# Patient Record
Sex: MALE | Race: WHITE
[De-identification: names, ages, dates, MRNs, and addresses within clinical notes are randomized per-mention and may not be internally consistent; named-entity substitution may affect disease eponyms.]

---

## 2020-01-07 ENCOUNTER — HOSPITAL ENCOUNTER (OUTPATIENT)
Dept: HOSPITAL 47 - EC | Age: 55
Setting detail: OBSERVATION
LOS: 1 days | Discharge: HOME | End: 2020-01-08
Attending: FAMILY MEDICINE | Admitting: FAMILY MEDICINE
Payer: COMMERCIAL

## 2020-01-07 VITALS — RESPIRATION RATE: 18 BRPM

## 2020-01-07 VITALS — BODY MASS INDEX: 22.7 KG/M2

## 2020-01-07 DIAGNOSIS — R13.10: ICD-10-CM

## 2020-01-07 DIAGNOSIS — E86.0: ICD-10-CM

## 2020-01-07 DIAGNOSIS — K22.2: Primary | ICD-10-CM

## 2020-01-07 DIAGNOSIS — K57.90: ICD-10-CM

## 2020-01-07 DIAGNOSIS — Z90.81: ICD-10-CM

## 2020-01-07 DIAGNOSIS — Z87.738: ICD-10-CM

## 2020-01-07 DIAGNOSIS — R63.4: ICD-10-CM

## 2020-01-07 LAB
ALBUMIN SERPL-MCNC: 3.8 G/DL (ref 3.5–5)
ALP SERPL-CCNC: 98 U/L (ref 38–126)
ALT SERPL-CCNC: 32 U/L (ref 4–49)
AMYLASE SERPL-CCNC: 69 U/L (ref 30–110)
ANION GAP SERPL CALC-SCNC: 7 MMOL/L
AST SERPL-CCNC: 36 U/L (ref 17–59)
BASOPHILS # BLD AUTO: 0 K/UL (ref 0–0.2)
BASOPHILS NFR BLD AUTO: 1 %
BUN SERPL-SCNC: 42 MG/DL (ref 9–20)
CALCIUM SPEC-MCNC: 9.8 MG/DL (ref 8.4–10.2)
CHLORIDE SERPL-SCNC: 102 MMOL/L (ref 98–107)
CO2 SERPL-SCNC: 31 MMOL/L (ref 22–30)
EOSINOPHIL # BLD AUTO: 0.1 K/UL (ref 0–0.7)
EOSINOPHIL NFR BLD AUTO: 2 %
ERYTHROCYTE [DISTWIDTH] IN BLOOD BY AUTOMATED COUNT: 4.01 M/UL (ref 4.3–5.9)
ERYTHROCYTE [DISTWIDTH] IN BLOOD: 11.9 % (ref 11.5–15.5)
GLUCOSE SERPL-MCNC: 139 MG/DL (ref 74–99)
HCT VFR BLD AUTO: 35.3 % (ref 39–53)
HGB BLD-MCNC: 11.9 GM/DL (ref 13–17.5)
LYMPHOCYTES # SPEC AUTO: 1.2 K/UL (ref 1–4.8)
LYMPHOCYTES NFR SPEC AUTO: 27 %
MCH RBC QN AUTO: 29.7 PG (ref 25–35)
MCHC RBC AUTO-ENTMCNC: 33.7 G/DL (ref 31–37)
MCV RBC AUTO: 88 FL (ref 80–100)
MONOCYTES # BLD AUTO: 0.2 K/UL (ref 0–1)
MONOCYTES NFR BLD AUTO: 5 %
NEUTROPHILS # BLD AUTO: 2.8 K/UL (ref 1.3–7.7)
NEUTROPHILS NFR BLD AUTO: 63 %
PH UR: 6 [PH] (ref 5–8)
PLATELET # BLD AUTO: 208 K/UL (ref 150–450)
POTASSIUM SERPL-SCNC: 4.1 MMOL/L (ref 3.5–5.1)
PROT SERPL-MCNC: 6.8 G/DL (ref 6.3–8.2)
SODIUM SERPL-SCNC: 140 MMOL/L (ref 137–145)
SP GR UR: 1.02 (ref 1–1.03)
SQUAMOUS UR QL AUTO: <1 /HPF (ref 0–4)
UROBILINOGEN UR QL STRIP: <2 MG/DL (ref ?–2)
WBC # BLD AUTO: 4.4 K/UL (ref 3.8–10.6)
WBC # UR AUTO: 3 /HPF (ref 0–5)

## 2020-01-07 PROCEDURE — 96360 HYDRATION IV INFUSION INIT: CPT

## 2020-01-07 PROCEDURE — 82150 ASSAY OF AMYLASE: CPT

## 2020-01-07 PROCEDURE — 99285 EMERGENCY DEPT VISIT HI MDM: CPT

## 2020-01-07 PROCEDURE — 83690 ASSAY OF LIPASE: CPT

## 2020-01-07 PROCEDURE — 80053 COMPREHEN METABOLIC PANEL: CPT

## 2020-01-07 PROCEDURE — 43239 EGD BIOPSY SINGLE/MULTIPLE: CPT

## 2020-01-07 PROCEDURE — 74177 CT ABD & PELVIS W/CONTRAST: CPT

## 2020-01-07 PROCEDURE — 36415 COLL VENOUS BLD VENIPUNCTURE: CPT

## 2020-01-07 PROCEDURE — 43249 ESOPH EGD DILATION <30 MM: CPT

## 2020-01-07 PROCEDURE — 85025 COMPLETE CBC W/AUTO DIFF WBC: CPT

## 2020-01-07 PROCEDURE — 88305 TISSUE EXAM BY PATHOLOGIST: CPT

## 2020-01-07 PROCEDURE — 81001 URINALYSIS AUTO W/SCOPE: CPT

## 2020-01-07 RX ADMIN — CEFAZOLIN SCH MLS/HR: 330 INJECTION, POWDER, FOR SOLUTION INTRAMUSCULAR; INTRAVENOUS at 13:56

## 2020-01-07 NOTE — CT
EXAMINATION TYPE: CT abdomen pelvis w con

 

DATE OF EXAM: 1/7/2020

 

COMPARISON: None

 

INDICATION: Epigastric pain weight loss nausea vomiting

 

DLP: 1009.7 mGycm, Automated exposure control for dose reduction was used.

 

CONTRAST:  100 mL of Isovue 300. 

                        Study performed without Oral Contrast

 

TECHNIQUE: Axial images were obtained from above the diaphragm to the pubic rami in the axial plane a
t 5 mm thick sections.  Reconstructed images are reviewed on the computer in the coronal plane.  Exam
 is somewhat limited due to paucity of abdominal fat.

 

FINDINGS:

 

Limited CT sections are obtained the lung bases.  The lung bases are clear.  Small hiatal hernia is p
resent.

 

CT ABDOMEN:

 

Liver: Normal

 

Spleen: Normal

 

Pancreas: Normal

 

Adrenal glands: The adrenal glands are normal.

 

Gallbladder: Normal  

 

Kidneys: No masses are evident. No hydronephrosis is present.   No cysts are present.  Delayed images
 were obtained through the kidneys, which remain unremarkable.

 

Aorta: Normal

 

Inferior vena cava: Normal.

 

CT PELVIS: 

Loops of bowel within the abdomen and pelvis are normal.   Few diverticuli are through the sigmoid co
primitivo without evidence of acute diverticulitis.  There are loops of bowel which are incompletely disten
ded or lack oral contrast limiting their evaluation.

 

Appendix: Normal as visualized.

 

Urinary bladder: Normal. 

 

Genitourinary structures:

 

Osseous structures: No suspicious lytic or sclerotic lesions.

 

IMPRESSIONS:

1.  Diverticulosis without acute diverticulitis.

2. Small hiatal hernia.

3. No other suspicious abnormality to account for epigastric pain or weight loss
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
## 2020-01-08 VITALS — HEART RATE: 68 BPM | SYSTOLIC BLOOD PRESSURE: 120 MMHG | DIASTOLIC BLOOD PRESSURE: 63 MMHG

## 2020-01-08 VITALS — TEMPERATURE: 97.6 F

## 2020-01-08 RX ADMIN — CEFAZOLIN SCH: 330 INJECTION, POWDER, FOR SOLUTION INTRAMUSCULAR; INTRAVENOUS at 05:11

## 2020-01-08 NOTE — P.PCN
Date of Procedure: 01/08/20


Procedure(s) Performed: 


BRIEF HISTORY: Patient is a 54-year-old, pleasant, white male admitted hospital 

with severe progressive dysphagia for the last 2 years duration.  He lost 

approximately 70 pounds since onset of the symptoms.  He has severe passive 

regurgitation and occasional heartburn.  CT of abdomen was negative.  His and 

scheduled for an upper endoscopy to evaluate further. 





PROCEDURE PERFORMED: Esophagoscopy with biopsy and dilation.





PREOPERATIVE DIAGNOSIS: Progressive dysphagia to solids and weight loss of 70 

pounds for the last 2 years duration. 





IV sedation per anesthesia. 





PROCEDURE: After informed consent was obtained, the patient  was brought into 

the endoscopy unit. IV sedation was administered by Anesthesia under continuous 

monitoring. Initially the Olympus GIF-140 video endoscope was inserted into the 

mouth. Esophagus intubated without any difficulty. It was gradually advanced 

into the distal esophagus and there was a tight stricture identified at 40 cm 

from the incisors.  The scope could not be advanced through the stricture.  At 

this time I proceeded with a balloon dilation using 8-10 mm TTS balloon for 

total of 90 seconds and a sequential fashion.  There were thickened folds noted 

in the distal esophagus at the site of esophageal stricture.  Despite dilation 

was still not able to advance the scope into the stomach.  Multiple biopsies 

were done from the mucosal folds of the distal esophagus.  No obvious masses 

identified.  The GE junction was located at 39 cm from the incisors. The rest of

the esophagus appeared normal and the patient tolerated the procedure well. 





IMPRESSION: 


1.  Tight distal esophageal stricture with mucosal erythema, edema and thickened

mucosal folds status post balloon dilation using 8-10 mm TTS balloon as 

described above.


2.  Scope could not be advanced into the stomach..





RECOMMENDATIONS: The findings of this examination were discussed with the 

patient as well as his family.  He'll remain on a clear liquid diet.  We'll 

start him on Protonix 40 mg twice daily.  He can be discharged home today or 

tomorrow with outpatient follow-up in one week..

## 2020-01-12 NOTE — HP
HISTORY AND PHYSICAL



HISTORY OF PRESENT ILLNESS:

A 54-year-old white male presents with nausea, vomiting, weight loss, ongoing for a

year, went to PCP, told him to come to the ER. Had intermittent epigastric pain, 40

pounds loss in 6 months, unable to keep anything down. 60 pounds weight loss in 6

months. Unable to keep any liquid and solids down.



PAST MEDICAL HISTORY:

History of splenectomy and pyloric stenosis as a kid.



REVIEW OF SYSTEMS:

Fourteen-point review of systems negative except for mentioned in HPI.

VITAL SIGNS: Temp 97.1, respiratory rate 16 to 18, blood pressure 120s to 130s over 70s

to 80.  100% on room air.  Pulse is 70s to 80s.  GI soft, nontender.

NEUROLOGIC: Cranial nerves are intact.

PSYCH:  Fair mood and affect.

SKIN:  Warm and dry.

ABDOMEN:  Soft, nontender.



White count 4.4, hemoglobin 11.9, BUN 42, creatinine 0.95.  UA is negative.



ASSESSMENT:

Weight loss, nausea, vomiting, epigastric pain, dehydration.  Admit the patient.

Rehydrate him.  Get the EGD.  Further orders pending Gastroenterology consult for an

EGD recommendations.





MMODL / IJN: [id number] / Job#: [id number]

## 2020-01-14 NOTE — DS
DISCHARGE SUMMARY



A 54-year-old white male who was admitted for progressive dysphagia over the past 2

years, losing 70 pounds.  He has severe passive regurgitation and heartburn.  CT was

negative.  His EGD was scheduled and performed it. Had esophagitis with some biopsies,

was given Protonix 40 mg a day and sent home.  He also had a tight stricture at the

lower esophageal stomach border for which patient had esophageal stricture repair with

multiple biopsies.  Biopsies showed high-grade dysplasia, felt to await further

pathology where it was sent to the [facility].  He will continue on Protonix 40 mg

b.i.d. and discharged home, have to follow up in the office after pathology from [facility] is back. No smoking. No late night eating.  Head of bed elevation.





MMODL / IJN: [id number] / Job#: [id number]

## 2020-03-06 ENCOUNTER — HOSPITAL ENCOUNTER (OUTPATIENT)
Dept: HOSPITAL 47 - ORWHC2ENDO | Age: 55
Discharge: HOME | End: 2020-03-06
Attending: INTERNAL MEDICINE
Payer: COMMERCIAL

## 2020-03-06 VITALS — RESPIRATION RATE: 16 BRPM | TEMPERATURE: 97.4 F

## 2020-03-06 VITALS — HEART RATE: 57 BPM | DIASTOLIC BLOOD PRESSURE: 58 MMHG | SYSTOLIC BLOOD PRESSURE: 106 MMHG

## 2020-03-06 DIAGNOSIS — Z87.81: ICD-10-CM

## 2020-03-06 DIAGNOSIS — Z98.890: ICD-10-CM

## 2020-03-06 DIAGNOSIS — Z79.899: ICD-10-CM

## 2020-03-06 DIAGNOSIS — C15.5: Primary | ICD-10-CM

## 2020-03-06 DIAGNOSIS — K22.2: ICD-10-CM

## 2020-03-06 PROCEDURE — 43239 EGD BIOPSY SINGLE/MULTIPLE: CPT

## 2020-03-06 PROCEDURE — 43249 ESOPH EGD DILATION <30 MM: CPT

## 2020-03-06 PROCEDURE — 88305 TISSUE EXAM BY PATHOLOGIST: CPT

## 2020-03-06 NOTE — P.PCN
Date of Procedure: 03/06/20


Procedure(s) Performed: 


BRIEF HISTORY: Patient is a 54-year-old, pleasant, white male scheduled for an 

upper endoscopy for evaluation of progressive dysphagia to solids and weight 

loss for the last 1 year duration.  He had an upper endoscopy done by me in 

January of this year that revealed a tight distal esophageal stricture that was 

dilated with a 10 mm balloon but the scope could not be advanced any further.  

Biopsies revealed squamocolumnar mucosa with high-grade dysplasia.  His and his 

and scheduled for repeat upper endoscopy with biopsies for definitive 

diagnosis.. 





PROCEDURE PERFORMED: Esophagoscopy with multiple biopsies and dilation.





PREOPERATIVE DIAGNOSIS: Distal esophageal stricture with high-grade dysplasia on

recent upper endoscopy. 





IV sedation per anesthesia. 





PROCEDURE: After informed consent was obtained, the patient  was brought into 

the endoscopy unit. IV sedation was administered by Anesthesia under continuous 

monitoring. Initially the Olympus GIF-140 video endoscope was inserted into the 

mouth. Esophagus intubated without any difficulty. It was gradually advanced 

into the  distal esophagus and at 39-40 cm from the incisors there was a very 

tight esophageal stricture identified with small amount of retained liquid in 

the proximal esophagus.  The scope could not be advanced to the stricture.  At 

this time I proceeded with a balloon dilation using 8-10 mm TTS balloon in a 

sequential fashion for 90 seconds.  Following this despite multiple attempts I 

was not able to advance the scope into the stomach.  At this time I proceeded 

with multiple biopsies of the esophageal stricture with the mucosa appeared very

friable, erythematous with thickened mucosal folds but no obvious masses 

identified.  There was mild dilation of the esophagus just proximal to the 

stricture area and The rest of the esophagus appeared normal and the patient 

tolerated the procedure well. 





IMPRESSION: 


1.  Tight distal esophageal stricture with mucosal erythema friability and 

thickened folds but no obvious mass status post balloon dilation followed by 

multiple biopsies to rule out malignancy.


2.  Scope could not be advanced into the stomach and duodenum.





RECOMMENDATIONS: The findings of this examination were discussed with the 

patient as well as his family.  He will be seen in office in 5 days to discuss 

the biopsy results.

## 2020-03-21 ENCOUNTER — HOSPITAL ENCOUNTER (OUTPATIENT)
Dept: HOSPITAL 47 - RADPETMAIN | Age: 55
Discharge: HOME | End: 2020-03-21
Attending: INTERNAL MEDICINE
Payer: COMMERCIAL

## 2020-03-21 DIAGNOSIS — C15.5: Primary | ICD-10-CM

## 2020-03-21 PROCEDURE — 78815 PET IMAGE W/CT SKULL-THIGH: CPT

## 2020-03-24 NOTE — PE
EXAMINATION TYPE: PET CT fusion skull to thigh

 

DATE OF EXAM: 3/21/2020

 

COMPARISON: CT abdomen and pelvis January 7, 2020.

 

HISTORY: Esophageal cancer   

 

TECHNIQUE:  Following the intravenous administration of 12.17 mCi of F-18 FDG, whole body images are 
performed from the skull base to the midthigh.  Images are reviewed on the computer in the coronal, a
xial, and sagittal planes.  Reconstructed rotating images are created on independent workstation and 
reviewed on the computer.   A noncontrast CT is performed in conjunction with the PET scan.

 

SCAN: Initial Scan

 

FINDINGS: 

 

SKULL BASE AND NECK:  No suspicious hypermetabolic uptake.

 

CHEST, MEDIASTINUM, AND HILAR REGION: There is concentric wall thickening distal esophagus redemonstr
ated just above diaphragm beginning near axial image 127 through 136, hypermetabolic uptake is presen
t images 127 through 132, max SUV is 6.65 on image 131. 

 

No additional areas of suspicious hypermetabolic uptake are present in the remainder of the thorax. N
o suspicious adjacent lymph nodes seen. No abnormal uptake or extension below the diaphragm clearly i
dentified.

 

ABDOMEN AND PELVIS: Normal excretion is seen. Patient has very little intra-abdominal fat. No suspici
ous hypermetabolic uptake.

 

OSSEOUS STRUCTURES: No suspicious hypermetabolic uptake.

 

OTHER CT: Patient has very little intra-abdominal fat. No significant incidental findings.

 

IMPRESSION: Hypermetabolic uptake in the distal esophagus corresponds to known neoplasm. No metastati
c disease is evident.

## 2020-06-19 ENCOUNTER — HOSPITAL ENCOUNTER (OUTPATIENT)
Dept: HOSPITAL 47 - RADPETMAIN | Age: 55
Discharge: HOME | End: 2020-06-19
Attending: INTERNAL MEDICINE
Payer: COMMERCIAL

## 2020-06-19 DIAGNOSIS — C15.5: Primary | ICD-10-CM

## 2020-06-19 PROCEDURE — 78815 PET IMAGE W/CT SKULL-THIGH: CPT

## 2021-04-17 ENCOUNTER — HOSPITAL ENCOUNTER (OUTPATIENT)
Dept: HOSPITAL 47 - RADPETMAIN | Age: 56
Discharge: HOME | End: 2021-04-17
Attending: FAMILY MEDICINE
Payer: COMMERCIAL

## 2021-04-17 DIAGNOSIS — C15.9: Primary | ICD-10-CM

## 2021-04-17 PROCEDURE — 78815 PET IMAGE W/CT SKULL-THIGH: CPT

## 2021-04-19 NOTE — PE
EXAMINATION TYPE: PET CT fusion skull to thigh

 

DATE OF EXAM: 4/17/2021

 

COMPARISON: Prior PET/CT June 19, 2020 and older studies

 

HISTORY: Esophageal cancer progress study originally diagnosed by endoscopy with biopsy January and c
ompleted chemotherapy and radiation treatment in May or June 2020 per patient.      

 

TECHNIQUE:  Following the intravenous administration of 13.76 mCi of F-18 FDG, whole body images are 
performed from the skull base to the midthigh.  Images are reviewed on the computer in the coronal, a
xial, and sagittal planes.  Reconstructed rotating images are created on independent workstation and 
reviewed on the computer.   A localization and attenuation correction CT is performed in conjunction 
with the PET scan. Blood glucose level equals 98.

 

SCAN: Subsequent Scan

 

FINDINGS: 

 

SKULL BASE AND NECK:   No areas of new suspicious hypermetabolic uptake on current study.

 

CHEST, MEDIASTINUM, AND HILAR REGION: Persistent moderate to severe concentric wall thickening in the
 distal esophagus just above diaphragm from axial image 124 through 135. Prior visualized mild hyperm
etabolic uptake appear to correspond to central aspect or lumen on prior study. On current study ther
e is new more focal mild hypermetabolic uptake along the left posterior aspect centered image 129, ma
x SUV is 2.87. Recurrent active neoplasm needs to be considered.

 

ABDOMEN AND PELVIS: Normal excretion is present. No new areas of abnormal hypermetabolic uptake.

 

OSSEOUS STRUCTURES: No new areas of abnormal hypermetabolic uptake.

 

OTHER CT: Patient has very little intra-abdominal fat making evaluation suboptimal similar to prior. 
Lungs are currently clear. No new findings are evident.

 

IMPRESSION: New suspicious focal mild increased hypermetabolic uptake worrisome for local active neop
lastic recurrence distal esophageal level. No new metastatic disease noted.

## 2021-04-26 ENCOUNTER — HOSPITAL ENCOUNTER (OUTPATIENT)
Dept: HOSPITAL 47 - ORWHC2ENDO | Age: 56
Discharge: HOME | End: 2021-04-26
Attending: INTERNAL MEDICINE
Payer: COMMERCIAL

## 2021-04-26 VITALS — SYSTOLIC BLOOD PRESSURE: 112 MMHG | HEART RATE: 56 BPM | RESPIRATION RATE: 16 BRPM | DIASTOLIC BLOOD PRESSURE: 72 MMHG

## 2021-04-26 VITALS — BODY MASS INDEX: 23.1 KG/M2

## 2021-04-26 VITALS — TEMPERATURE: 97.8 F

## 2021-04-26 DIAGNOSIS — Z85.01: ICD-10-CM

## 2021-04-26 DIAGNOSIS — K22.2: Primary | ICD-10-CM

## 2021-04-26 DIAGNOSIS — Z92.3: ICD-10-CM

## 2021-04-26 DIAGNOSIS — Z92.21: ICD-10-CM

## 2021-04-26 DIAGNOSIS — R63.4: ICD-10-CM

## 2021-04-26 DIAGNOSIS — Z79.899: ICD-10-CM

## 2021-04-26 DIAGNOSIS — Z88.6: ICD-10-CM

## 2021-04-26 PROCEDURE — 43249 ESOPH EGD DILATION <30 MM: CPT

## 2021-04-26 PROCEDURE — 43239 EGD BIOPSY SINGLE/MULTIPLE: CPT

## 2021-04-26 PROCEDURE — 88305 TISSUE EXAM BY PATHOLOGIST: CPT

## 2021-04-26 NOTE — P.PCN
Date of Procedure: 04/26/21


Procedure(s) Performed: 


BRIEF HISTORY: Patient is a 55-year-old, pleasant, white male diagnosed with 

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 

radiation as well as chemotherapy.  Lately has been complaining of dysphagia for

the last 6 months duration with weight loss of 20 pounds.  He is on a soft diet.

 He had a PET scan that he can go that did show increased uptake in the distal 

esophagus and no other metastasis noted..  He scheduled for an upper endoscopy 

with dilation today.





PROCEDURE PERFORMED: Esophagoscopy with biopsy and dilation.





PREOPERATIVE DIAGNOSIS: History of esophageal cancer diagnosed have a 2020 

status post chemo/radiation therapy/now with progressive dysphagia to solids. 





IV sedation per anesthesia. 





PROCEDURE: After informed consent was obtained, the patient  was brought into 

the endoscopy unit. IV sedation was administered by Anesthesia under continuous 

monitoring. Initially the Olympus GIF-140 video endoscope was inserted into the 

mouth. Esophagus intubated without any difficulty. It was gradually advanced 

into the distal esophagus.  There was a tight esophageal stricture identified at

the scope could not be advanced through the stricture.  The luminal diameter was

about 84 mm.  At this time I proceeded with balloon dilation.  The balloon was 

passed through the stricture without any difficulty and was dilated to 8 mm, 9 

mm and 10 mm sequentially for 90 seconds.  Following this there was oozing 

identified and the site of dilation.  I was not able to advance the scope into 

the stomach.  The mucosa appeared slightly friable but there were no obvious 

mass identified.  Multiple biopsies were done from the distal esophagus.  The GE

junction was located at 40 cm from the incisors. The rest of the esophagus 

appeared normal. The patient tolerated the procedure well. 





IMPRESSION: 


1.  Tight distal esophageal stricture with friable mucosa status post balloon 

dilation using 8, 9 and 10 mm TTS balloon as described above.


2.  No obvious distal esophageal mass identified, however the scope could be 

advanced into the stomach because of the tight esophageal stricture.





RECOMMENDATIONS: The findings of this examination were discussed with the 

patient as well as a family.  He was advised to remain on a clear liquid diet 

today.  We'll await biopsy results and he'll be seen in office in 2 weeks.

## 2021-09-01 ENCOUNTER — HOSPITAL ENCOUNTER (OUTPATIENT)
Dept: HOSPITAL 47 - ORWHC2ENDO | Age: 56
Discharge: HOME | End: 2021-09-01
Attending: INTERNAL MEDICINE
Payer: COMMERCIAL

## 2021-09-01 VITALS — HEART RATE: 61 BPM | DIASTOLIC BLOOD PRESSURE: 83 MMHG | SYSTOLIC BLOOD PRESSURE: 129 MMHG

## 2021-09-01 VITALS — TEMPERATURE: 97.7 F

## 2021-09-01 VITALS — RESPIRATION RATE: 16 BRPM

## 2021-09-01 DIAGNOSIS — K22.2: Primary | ICD-10-CM

## 2021-09-01 DIAGNOSIS — Z92.21: ICD-10-CM

## 2021-09-01 DIAGNOSIS — Z85.01: ICD-10-CM

## 2021-09-01 DIAGNOSIS — Z79.82: ICD-10-CM

## 2021-09-01 DIAGNOSIS — Z92.3: ICD-10-CM

## 2021-09-01 PROCEDURE — 43249 ESOPH EGD DILATION <30 MM: CPT

## 2021-09-01 PROCEDURE — 43239 EGD BIOPSY SINGLE/MULTIPLE: CPT

## 2021-09-01 PROCEDURE — 88305 TISSUE EXAM BY PATHOLOGIST: CPT

## 2021-09-01 NOTE — P.PCN
Date of Procedure: 09/01/21


Procedure(s) Performed: 


BRIEF HISTORY: Patient is a 55-year-old, pleasant, white male with history of 

esophageal adenocarcinoma diagnosed in February 2020, status post radiation as 

well as chemotherapy.  An upper endoscopy done in April 2021 that showed tight 

distal esophagus which was dilated.  He did well for a few months and now with 

worsening dysphagia to solids.  Hence scheduled for an upper endoscopy with 

dilation today.. 





PROCEDURE PERFORMED: Esophagogastroduodenoscopy with dilation and biopsy





PREOPERATIVE DIAGNOSIS: History of esophageal cancer diagnosed in 5 years 2020 

status post chemoradiation therapy, now with worsening dysphagia. 





IV sedation per anesthesia. 





PROCEDURE: After informed consent was obtained, the patient  was brought into 

the endoscopy unit. IV sedation was administered by Anesthesia under continuous 

monitoring. Initially the Olympus GIF-140 video endoscope was inserted into the 

mouth. Esophagus intubated without any difficulty. It was gradually advanced 

into the distal esophagus and there was a tight stricture noted.  The scope 

could not be advanced and the stricture.  The diameter of the opening was about 

3 mm.  At this time I proceeded with balloon dilation using 8-10 mm TTS balloon 

in a sequential fashion for 90 seconds.  Following this there was some oozing 

noted at the site of dilation but I was not able to advance the scope into the 

stomach.  The The GE junction was located at 39 cm from the incisors.  There was

no obvious mass identified.  The mucosa appeared somewhat erythematous and 

thickened at the site of the stricture and biopsies were done from this area.  

The rest of the esophagus appeared slightly dilated with retained food in the st

omach there was thoroughly aspirated.  The patient tolerated the procedure well.

 . 





IMPRESSION: 


1.  Tight distal esophageal stricture status post balloon dilation using 8-10 mm

balloon as described above.


2.  Thickened folds in the distal esophagus but no obvious mass noted.  Status 

post multiple biopsies.





RECOMMENDATIONS: The findings of this examination were discussed with the 

patient as well as his family.  He was advised to remain on a clear liquid diet 

today.  Await biopsy results..

## 2021-09-17 ENCOUNTER — HOSPITAL ENCOUNTER (OUTPATIENT)
Dept: HOSPITAL 47 - RADPETMAIN | Age: 56
Discharge: HOME | End: 2021-09-17
Attending: INTERNAL MEDICINE
Payer: COMMERCIAL

## 2021-09-17 DIAGNOSIS — M79.89: ICD-10-CM

## 2021-09-17 DIAGNOSIS — C15.5: Primary | ICD-10-CM

## 2021-09-17 PROCEDURE — 78815 PET IMAGE W/CT SKULL-THIGH: CPT

## 2021-09-22 NOTE — PE
Nuclear medicine PET/CT

 

HISTORY: C 15.5, esophageal carcinoma, subsequent

 

Patient received 13.4 mCi F-18 FDG intravenously in delayed scanning was performed from the skull bas
e to the mid thighs. A localization and attenuation correction CT scan was performed, correlation to 
prior exam 4/17/2021

 

Chest and neck: There is no cervical or supraclavicular adenopathy. No mediastinal, axillary, or bob
r adenopathy. There is an interval development of circumferential wall thickening at the level of the
 distal esophagus, retained secretions are present within the esophagus. There is corresponding incre
ase in the hypermetabolic uptake involving the distal esophagus, SUV is 4.9. There is soft tissue dequan
ng the cardiophrenic angle anteriorly with some mild associated uptake present, abnormality is pleura
l-based and measures approximately 2 cm, SUV is 2.3, an interval finding. No pleural or pericardial e
ffusion.

 

ABDOMEN: No evident liver mass or upper abdominal adenopathy. No suspicious uptake. There is no ascit
es. Patient is cachectic.

 

Osseous structures show no suspicious uptake. Postop change suspected on the right ilium posteriorly 
on the right. Suspect physiologic uptake along the musculature of the proximal right lower extremity 
medially, SUV is 1.9-2.

 

 

IMPRESSION: Interval development of hypermetabolic soft tissue which is pleural-based in the anterior
 aspect of the right lower hemithorax. Interval progression in hypermetabolic uptake at the level of 
the distal esophagus. Additional findings above.

## 2021-10-13 ENCOUNTER — HOSPITAL ENCOUNTER (OUTPATIENT)
Dept: HOSPITAL 47 - ORWHC2ENDO | Age: 56
Discharge: HOME | End: 2021-10-13
Attending: INTERNAL MEDICINE
Payer: COMMERCIAL

## 2021-10-13 VITALS — RESPIRATION RATE: 16 BRPM | TEMPERATURE: 97.6 F

## 2021-10-13 VITALS — SYSTOLIC BLOOD PRESSURE: 154 MMHG | DIASTOLIC BLOOD PRESSURE: 91 MMHG | HEART RATE: 68 BPM

## 2021-10-13 VITALS — BODY MASS INDEX: 19.2 KG/M2

## 2021-10-13 DIAGNOSIS — K22.2: Primary | ICD-10-CM

## 2021-10-13 DIAGNOSIS — Z88.6: ICD-10-CM

## 2021-10-13 DIAGNOSIS — Z92.21: ICD-10-CM

## 2021-10-13 DIAGNOSIS — F98.8: ICD-10-CM

## 2021-10-13 DIAGNOSIS — Z92.3: ICD-10-CM

## 2021-10-13 DIAGNOSIS — Z85.01: ICD-10-CM

## 2021-10-13 DIAGNOSIS — F32.9: ICD-10-CM

## 2021-10-13 DIAGNOSIS — F41.9: ICD-10-CM

## 2021-10-13 DIAGNOSIS — Z79.899: ICD-10-CM

## 2021-10-13 PROCEDURE — 43249 ESOPH EGD DILATION <30 MM: CPT

## 2021-10-13 PROCEDURE — 88305 TISSUE EXAM BY PATHOLOGIST: CPT

## 2021-10-13 PROCEDURE — 43239 EGD BIOPSY SINGLE/MULTIPLE: CPT

## 2021-10-13 NOTE — P.PCN
Date of Procedure: 10/13/21


Procedure(s) Performed: 


BRIEF HISTORY: Patient is a 55-year-old, pleasant, white male with history of 

distal esophageal adenocarcinoma diagnosed in March 2020..  He was status post 

chemo followed by radiation therapy and subsequently developed a tight distal 

esophageal stricture.  He underwent an EGD with dilation a month ago.  Because 

of persistent symptoms he scheduled for repeat upper endoscopy with dilation 

today.. 





PROCEDURE PERFORMED: Esophagogastroduodenoscopy with dilation and biopsy.





PREOPERATIVE DIAGNOSIS: Progressive dysphagia to solids and weight loss/distal 

esophageal stricture. 





IV sedation per anesthesia. 





PROCEDURE: After informed consent was obtained, the patient  was brought into 

the endoscopy unit. IV sedation was administered by Anesthesia under continuous 

monitoring. Initially the Olympus GIF-140 video endoscope was inserted into the 

mouth. Esophagus intubated without any difficulty. It was gradually advanced 

into the   distal esophagus where there was a tight esophageal stricture 

identified and the scope could not be advanced through the stricture.  At this 

time I proceeded with balloon dilation using 10-12 mm TTS balloon in a 

sequential fashion for 30 seconds.  Findings with dilation was still not able to

advance the scope through the stricture.  The mucosa of the stricture appeared 

friable but no mass lesion identified.  Multiple biopsies were done from the 

distal esophageal stricture.  The rest of the sophagus appeared normal. and the 

patient tolerated the procedure well. 





IMPRESSION: 


1..  Tight distal esophageal stricture with thickened mucosal fold status post 

balloon dilation using 10-12 mm TTS balloon as described above.


2.  Scope could not be advanced through the stricture.





RECOMMENDATIONS: The findings of this examination were discussed with the 

patient is well as his family.  He will remain on clear liquid diet today. await

biopsy results.  Follow with Dr. Taylor, as scheduled.

## 2021-11-05 ENCOUNTER — HOSPITAL ENCOUNTER (OUTPATIENT)
Dept: HOSPITAL 47 - OR | Age: 56
Discharge: HOME | End: 2021-11-05
Attending: SURGERY
Payer: COMMERCIAL

## 2021-11-05 ENCOUNTER — HOSPITAL ENCOUNTER (OUTPATIENT)
Dept: HOSPITAL 47 - RADECHMAIN | Age: 56
Discharge: HOME | End: 2021-11-05
Attending: INTERNAL MEDICINE
Payer: COMMERCIAL

## 2021-11-05 VITALS — BODY MASS INDEX: 18.4 KG/M2

## 2021-11-05 VITALS — HEART RATE: 71 BPM | SYSTOLIC BLOOD PRESSURE: 117 MMHG | DIASTOLIC BLOOD PRESSURE: 75 MMHG

## 2021-11-05 VITALS — TEMPERATURE: 97.9 F

## 2021-11-05 VITALS — RESPIRATION RATE: 16 BRPM

## 2021-11-05 DIAGNOSIS — C15.9: ICD-10-CM

## 2021-11-05 DIAGNOSIS — Z45.2: Primary | ICD-10-CM

## 2021-11-05 DIAGNOSIS — I07.1: Primary | ICD-10-CM

## 2021-11-05 PROCEDURE — 71045 X-RAY EXAM CHEST 1 VIEW: CPT

## 2021-11-05 PROCEDURE — 77001 FLUOROGUIDE FOR VEIN DEVICE: CPT

## 2021-11-05 PROCEDURE — 36561 INSERT TUNNELED CV CATH: CPT

## 2021-11-05 PROCEDURE — 93306 TTE W/DOPPLER COMPLETE: CPT

## 2021-11-05 NOTE — P.OP
Date of Procedure: 11/05/21


Preoperative Diagnosis: 


Esophageal cancer


Postoperative Diagnosis: 


Esophageal cancer


Procedure(s) Performed: 


Mediport placement with fluoroscopy


Anesthesia: SISI


Surgeon: Chester Hines


Pathology: none sent


Condition: stable


Disposition: same day


Indications for Procedure: 


55-year-old male with recent diagnosis of esophageal cancer.  After discussion 

with his oncologist, patient has opted for chemotherapy.  He states that he is 

able to tolerate oral intake and is refusing PEG tube placement.  Risks, 

benefits and alternatives were provided to the patient for Mediport placement.  

He is provided consent prior to attending the operating suite.


Operative Findings: 


Appropriate flush and withdraw from Mediport site


Description of Procedure: 


The patient was brought to the operating suite and placed in supine position on 

the operating table.  Sedation was provided by anesthesia the patient underwent 

endotracheal intubation.  The patient was then prepped and draped in regular 

sterile fashion.  Local anesthetic was administered and the anticipated 

insertion site for venous access and anticipated pocket patient for Mediport.  

Introducer needle was placed and the right subclavian vein was accessed on first

attempt.  Guidewire was then placed.  Guidewire location was confirmed under 

fluoroscopic guidance.  At this point incision was made just below the guidewire

and pocket for Mediport was created using cautery.  Hemostasis was maintained.  

A tunnel was created from the guidewire insertion site to the pocket and 

catheter was placed through this tunnel.  Under fluoroscopic guidance, dilator 

sheath was placed over the guidewire and into the subclavian vein.  The catheter

was then guided through the dilator sheath and was noted to be in appropriate 

position.  The catheter was then cut and attached to the Mediport and locked 

into place.  Cardenas needle was introduced and saline flush was noted to both 

flush and withdraw appropriately.  The Mediport was then sutured into place on 

the right chest wall pectoralis fascia.  This was completed with 2 separate 2-0 

Prolene suture.  Fluoroscopic guidance was used to confirm no kinking of the 

catheter.  Heparin flush was then injected into the port.  Wound was irrigated. 

Wound was then closed with 4-0 Vicryl subcuticular suture.  Sterile dressing was

applied.  The patient was awakened in the operating suite and taken to 

postanesthesia care unit with pending chest x-ray.

## 2021-11-05 NOTE — ECHOF
Referral Reason:[id number] Chemo exposure



MEASUREMENTS

--------

HEIGHT: 188.0 cm

WEIGHT: 70.3 kg

BP: 

RVIDd:   3.8 cm     (< 3.3)

IVSd:   1.2 cm     (0.6 - 1.1)

LVIDd:   5.4 cm     (3.9 - 5.3)

LVPWd:   1.3 cm     (0.6 - 1.1)

IVSs:   1.4 cm

LVIDs:   4.0 cm

LVPWs:   1.7 cm

LAESV Index (A-L):   27.49 ml/m

Ao Diam:   3.4 cm     (2.0 - 3.7)

AV Cusp:   2.4 cm     (1.5 - 2.6)

LA Diam:   2.9 cm     (2.7 - 3.8)

MV EXCURSION:   14.054 mm     (> 18.000)

MV EF SLOPE:   101 mm/s     (70 - 150)

EPSS:   1.0 cm

MV E Felice:   0.74 m/s

MV DecT:   106 ms

MV A Felice:   1.01 m/s

MV E/A Ratio:   0.73 

RAP:   5.00 mmHg

RVSP:   31.32 mmHg







FINDINGS

--------

Sinus rhythm.

This was a technically good study.

The left ventricular size is normal.   There is mild concentric left ventricular hypertrophy.   Overa
ll left ventricular systolic function is low-normal with, an EF between 50 - 55 %.

The right ventricle is mild to moderately enlarged.

Normal LA  size by volume 22+/-6 ml/m2.   A mass adjacent to the LA was seen almost ao all views

The right atrial size is normal.   Eustachian valve seen in the right atrium (normal finding).

Interatrial and interventricular septum intact.

The aortic valve is trileaflet and appears structurally normal.   There is no evidence of aortic regu
rgitation.   There is no evidence of aortic stenosis.

No mitral regurgitation.

Mild tricuspid regurgitation present.   There is no evidence of pulmonary hypertension.   The right v
entricular systolic pressure, as measured by Doppler, is 31.32mmHg.

There is no pulmonic regurgitation present.

The aortic root size is normal.

IVC Not well visulized.

There is no pericardial effusion.



CONCLUSIONS

--------

1. The left ventricular size is normal.

2. There is mild concentric left ventricular hypertrophy.

3. Overall left ventricular systolic function is low-normal with, an EF between 50 - 55 %.

4. The right ventricle is mild to moderately enlarged.

5. A mass adjacent to the LA was seen almost ao all views

6. Mild tricuspid regurgitation present.





SONOGRAPHER: Gardenia Caruso RDCS

## 2021-11-05 NOTE — FL
EXAMINATION TYPE: FL guided central line placemt

 

HISTORY: Fluoroscopy time

 

Impression:

1. Fluoroscopy support provided to the referring physician. 30 seconds provided.

## 2021-12-30 ENCOUNTER — HOSPITAL ENCOUNTER (OUTPATIENT)
Dept: HOSPITAL 47 - RADPETMAIN | Age: 56
Discharge: HOME | End: 2021-12-30
Attending: INTERNAL MEDICINE
Payer: COMMERCIAL

## 2021-12-30 DIAGNOSIS — C15.5: Primary | ICD-10-CM

## 2021-12-30 PROCEDURE — 78815 PET IMAGE W/CT SKULL-THIGH: CPT

## 2022-01-03 NOTE — PE
EXAMINATION TYPE: PET CT fusion skull to thigh

 

DATE OF EXAM: 12/30/2021

 

COMPARISON: Prior PET/CT September 17, 2021 and older studies

 

HISTORY: Esophageal cancer originally diagnosed in March 2020

Progress study.  On chemotherapy currently. 

 

TECHNIQUE:  Following the intravenous administration of 19.42 mCi of F-18 FDG, whole body images are 
performed from the skull base to the midthigh.  Images are reviewed on the computer in the coronal, a
xial, and sagittal planes.  Reconstructed rotating images are created on independent workstation and 
reviewed on the computer.   A localization and attenuation correction CT is performed in conjunction 
with the PET scan. Blood glucose level equals 131.

 

SCAN: Subsequent Scan

 

FINDINGS: 

 

SKULL BASE AND NECK:  Symmetric increased uptake bilateral masseter muscles with increased superior d
eep right sided uptake presumed product of chewing or eating meal prior to study. Correlate clinicall
y.

 

CHEST, MEDIASTINUM, AND HILAR REGION: Persistent moderate to severe concentric wall thickening in the
 distal esophagus just above diaphragm from axial image 116 through 127. Interval improvement in abno
rmal hypermetabolic uptake with Max SUV 3.95 currently versus 4.92 on prior study. 

 

No new areas of abnormal hypermetabolic uptake.

 

ABDOMEN AND PELVIS: Normal excretion is present. No new areas of abnormal hypermetabolic uptake.

 

OSSEOUS STRUCTURES: No new areas of abnormal hypermetabolic uptake.

 

OTHER CT: Patient has very little intra-abdominal fat making evaluation suboptimal similar to prior. 
Lungs remain clear. No new findings are evident.

 

IMPRESSION: Partial positive treatment response as detailed above.

## 2022-01-19 ENCOUNTER — HOSPITAL ENCOUNTER (OUTPATIENT)
Dept: HOSPITAL 47 - ORWHC2ENDO | Age: 57
Discharge: HOME | End: 2022-01-19
Attending: INTERNAL MEDICINE
Payer: COMMERCIAL

## 2022-01-19 VITALS — TEMPERATURE: 97.8 F

## 2022-01-19 VITALS — RESPIRATION RATE: 14 BRPM | SYSTOLIC BLOOD PRESSURE: 111 MMHG | HEART RATE: 67 BPM | DIASTOLIC BLOOD PRESSURE: 74 MMHG

## 2022-01-19 DIAGNOSIS — K22.2: ICD-10-CM

## 2022-01-19 DIAGNOSIS — C15.9: Primary | ICD-10-CM

## 2022-01-19 PROCEDURE — 43249 ESOPH EGD DILATION <30 MM: CPT

## 2022-01-19 NOTE — P.PCN
Date of Procedure: 01/19/22


Procedure(s) Performed: 


BRIEF HISTORY: Patient is a 56-year-old, pleasant, white male with history of 

recurrent distal esophageal adenocarcinoma initially diagnosed in March 2020.  

Initially had chemo and radiation therapy and subsequently developed recurrent 3

months ago.  He is undergoing second bowel movement chemo.  He is having 

progressive dysphagia to solids.  Last EGD with dilation wasn't October 2021 

that revealed a tight distal esophageal stricture which was dilated at that 

time.  He did well for 3 months.. 





PROCEDURE PERFORMED: Esophagooscopy with dilation





PREOPERATIVE DIAGNOSIS: Distal esophageal stricture/recurrent distal esophageal 

adenocarcinoma. 





IV sedation per anesthesia. 





PROCEDURE: After informed consent was obtained, the patient  was brought into 

the endoscopy unit. IV sedation was administered by Anesthesia under continuous 

monitoring. Initially the Olympus GIF-140 video endoscope was inserted into the 

mouth. Esophagus intubated without any difficulty. It was gradually advanced 

into the esophagus was dilated and there was large amount of liquid material 

that was aspirated.  There was tight distal esophageal stricture once again 

noted at 40 cm from the incisors with ulcerations noted.  Despite multiple times

I was unable to advance the scope was stricture.  At this time I proceeded with 

balloon dilation using 10 and 11 mm balloon in the sequential fashion for 60 

seconds.  Following this there was oozing identified the site of dilation and 

hence further dilation was not performed.  After the dilation was still not able

to advance the scope through the stricture.. The rest of the esophagus appeared 

normal. The patient tolerated the procedure well. 





IMPRESSION: 


1.  Tight distal esophageal stricture with mucosal ulcerations noted at 40 cm 

from the incisors status post balloon dilation using 10 and 11 mm TTS balloon as

described above.


2.  Dilated esophagus with retained liquids.





RECOMMENDATIONS: The findings of this examination were discussed with the 

patient as well as his family.  He was advised to remain on a clear liquid diet 

for today.  Advance as tolerated tomorrow..

## 2022-02-25 ENCOUNTER — HOSPITAL ENCOUNTER (OUTPATIENT)
Dept: HOSPITAL 47 - RADECHMAIN | Age: 57
Discharge: HOME | End: 2022-02-25
Attending: INTERNAL MEDICINE
Payer: COMMERCIAL

## 2022-02-25 DIAGNOSIS — I08.1: ICD-10-CM

## 2022-02-25 DIAGNOSIS — Z01.818: Primary | ICD-10-CM

## 2022-02-25 PROCEDURE — 93306 TTE W/DOPPLER COMPLETE: CPT

## 2022-02-25 NOTE — ECHOF
Referral Reason:[id number] per chemo



MEASUREMENTS

--------

HEIGHT: 157.5 cm

WEIGHT: 77.1 kg

BP: 

RVIDd:   2.2 cm     (< 3.3)

IVSd:   0.8 cm     (0.6 - 1.1)

LVIDd:   5.8 cm     (3.9 - 5.3)

LVPWd:   0.9 cm     (0.6 - 1.1)

IVSs:   1.3 cm

LVIDs:   4.0 cm

LVPWs:   1.8 cm

LAESV Index (A-L):   28.30 ml/m

Ao Diam:   3.7 cm     (2.0 - 3.7)

AV Cusp:   2.7 cm     (1.5 - 2.6)

LA Diam:   3.0 cm     (2.7 - 3.8)

MV EXCURSION:   20.477 mm     (> 18.000)

MV EF SLOPE:   164 mm/s     (70 - 150)

EPSS:   1.8 cm

MV E Felice:   0.73 m/s

MV DecT:   152 ms

MV A Felice:   0.96 m/s

MV E/A Ratio:   0.77 

RAP:   5.00 mmHg

RVSP:   36.04 mmHg

TAPSE:   33.32 mm







FINDINGS

--------

Sinus rhythm.

This was a technically good study.

The left ventricular size is normal.   Left ventricular wall thickness is normal.   Overall left vent
ricular systolic function is normal with, an EF between 55 - 60 %.   The diastolic filling pattern is
 normal for the age of the patient 9.45.

The right ventricle is normal in size.   The right ventricular systolic function is normal.

Normal LA  size by volume 22+/-6 ml/m2.

The right atrial size is normal.   Prominent Chiari network seen in right atrium (normal finding).

The aortic valve is trileaflet, and appears structurally normal. No aortic stenosis or regurgitation.


The mitral valve is normal.   Mild mitral regurgitation is present.

The tricuspid valve appears structurally normal.   Mild tricuspid regurgitation present.   Right vent
ricular systolic pressure is normal at < 35 mmHg.

There is no pulmonic regurgitation present.

The aortic root size is normal.

Normal inferior vena cava with normal inspiratory collapse consistent with estimated right atrial pre
ssure of  5 mmHg.

There is no pericardial effusion.



CONCLUSIONS

--------

1. Left ventricular wall thickness is normal.

2. Overall left ventricular systolic function is normal with, an EF between 55 - 60 %.

3. The diastolic filling pattern is normal for the age of the patient 9.45

4. The aortic valve is trileaflet, and appears structurally normal. No aortic stenosis or regurgitati
on.

5. Mild mitral regurgitation is present.

6. Mild tricuspid regurgitation present.

7. There is no pericardial effusion.





SONOGRAPHER: Brandy Rios RDCS

## 2022-03-25 ENCOUNTER — HOSPITAL ENCOUNTER (OUTPATIENT)
Dept: HOSPITAL 47 - RADPETMAIN | Age: 57
Discharge: HOME | End: 2022-03-25
Attending: INTERNAL MEDICINE
Payer: COMMERCIAL

## 2022-03-25 DIAGNOSIS — C15.5: Primary | ICD-10-CM

## 2022-03-25 PROCEDURE — 78815 PET IMAGE W/CT SKULL-THIGH: CPT

## 2022-03-27 NOTE — PE
EXAMINATION TYPE: PET CT fusion skull to thigh

 

DATE OF EXAM: 3/25/2022

 

COMPARISON: Prior PET/CT December 30, 2021 and older studies.

 

HISTORY: Esophageal cancer progress study.  Recently diagnosed March 2020. Completed chemotherapy 2 w
eeks ago. 

 

TECHNIQUE:  Following the intravenous administration of 10.21 mCi of F-18 FDG, whole body images are 
performed from the skull base to the midthigh.  Images are reviewed on the computer in the coronal, a
xial, and sagittal planes.  Reconstructed rotating images are created on independent workstation and 
reviewed on the computer.   A localization and attenuation correction CT is performed in conjunction 
with the PET scan. Blood glucose level equals 111.

 

SCAN: Subsequent Scan

 

FINDINGS: 

 

SKULL BASE AND NECK: Increased uptake bilateral masseter muscles with increased superior deep right s
ided uptake presumed product of chewing or eating meal prior to study. More superficial left-sided up
take is again seen. Correlate clinically. No new areas of abnormal hypermetabolic uptake.

 

CHEST, MEDIASTINUM, AND HILAR REGION: Persistent moderate concentric wall thickening in the distal es
ophagus just above diaphragm for reference axial image 121 current study. Current study is ametabolic
. 

 

No new areas of abnormal hypermetabolic uptake.

 

ABDOMEN AND PELVIS: Normal excretion is present. No new areas of abnormal hypermetabolic uptake.

 

OSSEOUS STRUCTURES: No new areas of abnormal hypermetabolic uptake.

 

OTHER CT: Patient has very little intra-abdominal fat making evaluation suboptimal similar to prior. 
Lungs remain clear. Right subclavian Mediport catheter redemonstrated. There is 2 mm nonobstructing c
alculus right kidney axial image 174 noted. No new findings are evident.

 

IMPRESSION: Complete positive treatment response as detailed above.

## 2022-05-23 ENCOUNTER — HOSPITAL ENCOUNTER (OUTPATIENT)
Dept: HOSPITAL 47 - ORWHC2ENDO | Age: 57
Discharge: HOME | End: 2022-05-23
Attending: INTERNAL MEDICINE
Payer: COMMERCIAL

## 2022-05-23 VITALS — BODY MASS INDEX: 17.7 KG/M2

## 2022-05-23 VITALS — DIASTOLIC BLOOD PRESSURE: 88 MMHG | HEART RATE: 56 BPM | SYSTOLIC BLOOD PRESSURE: 144 MMHG

## 2022-05-23 VITALS — TEMPERATURE: 96.7 F | RESPIRATION RATE: 16 BRPM

## 2022-05-23 DIAGNOSIS — K22.2: Primary | ICD-10-CM

## 2022-05-23 DIAGNOSIS — Z79.899: ICD-10-CM

## 2022-05-23 DIAGNOSIS — F32.A: ICD-10-CM

## 2022-05-23 DIAGNOSIS — Z92.21: ICD-10-CM

## 2022-05-23 DIAGNOSIS — Z88.6: ICD-10-CM

## 2022-05-23 DIAGNOSIS — Z92.3: ICD-10-CM

## 2022-05-23 DIAGNOSIS — Z85.01: ICD-10-CM

## 2022-05-23 DIAGNOSIS — F90.9: ICD-10-CM

## 2022-05-23 DIAGNOSIS — Z85.028: ICD-10-CM

## 2022-05-23 DIAGNOSIS — H91.90: ICD-10-CM

## 2022-05-23 DIAGNOSIS — F41.9: ICD-10-CM

## 2022-05-23 PROCEDURE — 43266 EGD ENDOSCOPIC STENT PLACE: CPT

## 2022-05-23 NOTE — P.PCN
Date of Procedure: 05/23/22


Procedure(s) Performed: 


BRIEF HISTORY: Patient is a 56-year-old, pleasant, white male with history of 

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 

therapy, and tight distal esophageal stricture for which she underwent EGD with 

dilation about ago.  Patient continues to have progressive dysphagia to solids 

and liquids weight loss.  His and scheduled for an upper endoscopy with a 

esophageal metal stent placement. 





PROCEDURE PERFORMED: EsophagogastroduodenoscopyWith esophageal metal stent 

placement





PREOPERATIVE DIAGNOSIS: [History of esophageal adenocarcinoma with tight distal 

esophageal stricture


 





IV sedation per anesthesia. 





PROCEDURE: After informed consent was obtained, the patient  was brought into 

the endoscopy unit. IV sedation was administered by Anesthesia under continuous 

monitoring. Initially the Olympus GIF-140 video endoscope was inserted into the 

mouth. Esophagus intubated without any difficulty. It was gradually advanced 

into the distal esophagus.  There was large amount of solid food noted in the 

proximal esophagus which was suctioned out.  The scope at this time was advanced

 into the distal esophageal in the proximal and the stricture was located at 40 

cm from the incisors.  The scope could not be advanced through the stricture.  

At this time a guidewire was passed through the scope across the stricture under

 fluoroscopy guidance.  Following this an 18 x 6 cm length metal Wallstent was 

passed over the guidewire under fluoroscopy and the stent was slowly deployed.  

Advance the deployment the stent distally to the stomach.  At this time the 

procedure was repeated again and this time under direct endoscopic guidance the 

guidewire was passed across the stricture into the stomach.  The stent was 

passed over the guidewire and gently advanced into the stomach and under 

fluoroscopy guidance the stent was deployed in the proximal part of the stent at

 least 3 cm above the esophageal stricture.  Patient tolerated the procedure 

well. 





IMPRESSION: 





Tight distal esophageal stricture located 40 cm from the incisors status post 

esophageal Wallstent measuring 18mm x 6 cm fully covered metal stent placement 

as described above 


Dilated esophagus with significant amount of retained solid food status post 

removal as described.





RECOMMENDATIONS: The findings of this examination were discussed with the 

patient as well as her family.  He will be on a soft diet today.  He will follow

 up in office in 2 weeks 


.

## 2022-05-23 NOTE — FL
Fluoroscopy

 

HISTORY: Esophageal stricture

 

3 minutes 7 seconds fluoroscopy time supplied to the referring clinician.  5 intraoperative C-arm qamar
ges document the procedure. See dictated report from gastroenterology.

## 2022-06-09 ENCOUNTER — HOSPITAL ENCOUNTER (OUTPATIENT)
Dept: HOSPITAL 47 - RADXRMAIN | Age: 57
Discharge: HOME | End: 2022-06-09
Attending: INTERNAL MEDICINE
Payer: COMMERCIAL

## 2022-06-09 DIAGNOSIS — K22.2: Primary | ICD-10-CM

## 2022-06-09 PROCEDURE — 71045 X-RAY EXAM CHEST 1 VIEW: CPT

## 2022-06-09 PROCEDURE — 74018 RADEX ABDOMEN 1 VIEW: CPT

## 2022-06-09 NOTE — XR
EXAMINATION TYPE: XR chest 1V

 

DATE OF EXAM: 6/9/2022

 

COMPARISON: NONE

 

HISTORY: Checking for stent placement

 

TECHNIQUE: Single frontal view of the chest is obtained.

 

FINDINGS:  There is no focal air space opacity, pleural effusion, or pneumothorax seen.  The cardiac 
silhouette size is within normal limits.   The osseous structures are intact. Metallic stent not seen
 with certainty. Mediport catheter noted.

 

IMPRESSION:  No acute process. See above.

## 2022-06-09 NOTE — XR
EXAMINATION TYPE: XR abdomen 1V

 

DATE OF EXAM: 6/9/2022

 

COMPARISON: NONE

 

HISTORY: Stent placement

 

TECHNIQUE: One view abdominal series

 

FINDINGS:  

The osseous structures are intact.  The bowel gas pattern is nonspecific. There is a large metallic s
tent in the left upper quadrant. Location is indeterminate. Could be within the stomach. Severe arthr
opathy of the hips. Hypertrophic and degenerative changes spine. Nonspecific sclerosis of the right s
acrum.

 

IMPRESSION:  

1. There is a metallic stent overlying the left upper quadrant possibly within the stomach. Correlate
 clinically. CT scan could be obtained for confirmation.

2. Nonspecific sclerosis right sacrum could be correlated with bone scan..

## 2022-11-11 ENCOUNTER — HOSPITAL ENCOUNTER (INPATIENT)
Dept: HOSPITAL 47 - EC | Age: 57
LOS: 3 days | Discharge: HOME | DRG: 947 | End: 2022-11-14
Attending: HOSPITALIST | Admitting: HOSPITALIST
Payer: COMMERCIAL

## 2022-11-11 DIAGNOSIS — F41.9: ICD-10-CM

## 2022-11-11 DIAGNOSIS — Z92.3: ICD-10-CM

## 2022-11-11 DIAGNOSIS — Z91.120: ICD-10-CM

## 2022-11-11 DIAGNOSIS — Z80.0: ICD-10-CM

## 2022-11-11 DIAGNOSIS — T39.96XA: ICD-10-CM

## 2022-11-11 DIAGNOSIS — F90.9: ICD-10-CM

## 2022-11-11 DIAGNOSIS — Z85.028: ICD-10-CM

## 2022-11-11 DIAGNOSIS — Z87.891: ICD-10-CM

## 2022-11-11 DIAGNOSIS — Z87.738: ICD-10-CM

## 2022-11-11 DIAGNOSIS — Z79.899: ICD-10-CM

## 2022-11-11 DIAGNOSIS — H91.90: ICD-10-CM

## 2022-11-11 DIAGNOSIS — C15.5: ICD-10-CM

## 2022-11-11 DIAGNOSIS — E86.1: ICD-10-CM

## 2022-11-11 DIAGNOSIS — R59.0: ICD-10-CM

## 2022-11-11 DIAGNOSIS — Z28.310: ICD-10-CM

## 2022-11-11 DIAGNOSIS — Z87.81: ICD-10-CM

## 2022-11-11 DIAGNOSIS — G89.3: Primary | ICD-10-CM

## 2022-11-11 DIAGNOSIS — Z80.42: ICD-10-CM

## 2022-11-11 DIAGNOSIS — E87.1: ICD-10-CM

## 2022-11-11 DIAGNOSIS — C78.7: ICD-10-CM

## 2022-11-11 DIAGNOSIS — K21.9: ICD-10-CM

## 2022-11-11 DIAGNOSIS — F32.A: ICD-10-CM

## 2022-11-11 DIAGNOSIS — Z92.21: ICD-10-CM

## 2022-11-11 DIAGNOSIS — D50.9: ICD-10-CM

## 2022-11-11 DIAGNOSIS — J18.9: ICD-10-CM

## 2022-11-11 LAB
PH UR: 8.5 [PH] (ref 5–8)
SP GR UR: 1.02 (ref 1–1.03)
UROBILINOGEN UR QL STRIP: <2 MG/DL (ref ?–2)

## 2022-11-11 PROCEDURE — 82607 VITAMIN B-12: CPT

## 2022-11-11 PROCEDURE — 84484 ASSAY OF TROPONIN QUANT: CPT

## 2022-11-11 PROCEDURE — 85025 COMPLETE CBC W/AUTO DIFF WBC: CPT

## 2022-11-11 PROCEDURE — 83605 ASSAY OF LACTIC ACID: CPT

## 2022-11-11 PROCEDURE — 83690 ASSAY OF LIPASE: CPT

## 2022-11-11 PROCEDURE — 99285 EMERGENCY DEPT VISIT HI MDM: CPT

## 2022-11-11 PROCEDURE — 81003 URINALYSIS AUTO W/O SCOPE: CPT

## 2022-11-11 PROCEDURE — 83550 IRON BINDING TEST: CPT

## 2022-11-11 PROCEDURE — 36415 COLL VENOUS BLD VENIPUNCTURE: CPT

## 2022-11-11 PROCEDURE — 82150 ASSAY OF AMYLASE: CPT

## 2022-11-11 PROCEDURE — 82728 ASSAY OF FERRITIN: CPT

## 2022-11-11 PROCEDURE — 80053 COMPREHEN METABOLIC PANEL: CPT

## 2022-11-11 PROCEDURE — 84145 PROCALCITONIN (PCT): CPT

## 2022-11-11 PROCEDURE — 80048 BASIC METABOLIC PNL TOTAL CA: CPT

## 2022-11-11 PROCEDURE — 96374 THER/PROPH/DIAG INJ IV PUSH: CPT

## 2022-11-11 PROCEDURE — 93005 ELECTROCARDIOGRAM TRACING: CPT

## 2022-11-11 PROCEDURE — 96375 TX/PRO/DX INJ NEW DRUG ADDON: CPT

## 2022-11-11 PROCEDURE — 83540 ASSAY OF IRON: CPT

## 2022-11-11 PROCEDURE — 71045 X-RAY EXAM CHEST 1 VIEW: CPT

## 2022-11-11 PROCEDURE — 82746 ASSAY OF FOLIC ACID SERUM: CPT

## 2022-11-11 PROCEDURE — 87040 BLOOD CULTURE FOR BACTERIA: CPT

## 2022-11-11 PROCEDURE — 96376 TX/PRO/DX INJ SAME DRUG ADON: CPT

## 2022-11-11 PROCEDURE — 74177 CT ABD & PELVIS W/CONTRAST: CPT

## 2022-11-12 LAB
ALBUMIN SERPL-MCNC: 3.4 G/DL (ref 3.5–5)
ALP SERPL-CCNC: 323 U/L (ref 38–126)
ALT SERPL-CCNC: 32 U/L (ref 4–49)
AMYLASE SERPL-CCNC: 42 U/L (ref 30–110)
ANION GAP SERPL CALC-SCNC: 1 MMOL/L
AST SERPL-CCNC: 35 U/L (ref 17–59)
BASOPHILS # BLD AUTO: 0 K/UL (ref 0–0.2)
BASOPHILS NFR BLD AUTO: 1 %
BUN SERPL-SCNC: 23 MG/DL (ref 9–20)
CALCIUM SPEC-MCNC: 8.7 MG/DL (ref 8.4–10.2)
CHLORIDE SERPL-SCNC: 100 MMOL/L (ref 98–107)
CO2 SERPL-SCNC: 34 MMOL/L (ref 22–30)
EOSINOPHIL # BLD AUTO: 0.1 K/UL (ref 0–0.7)
EOSINOPHIL NFR BLD AUTO: 2 %
ERYTHROCYTE [DISTWIDTH] IN BLOOD BY AUTOMATED COUNT: 3.19 M/UL (ref 4.3–5.9)
ERYTHROCYTE [DISTWIDTH] IN BLOOD: 14.9 % (ref 11.5–15.5)
FERRITIN SERPL-MCNC: 18.6 NG/ML (ref 22–322)
GLUCOSE SERPL-MCNC: 115 MG/DL (ref 74–99)
HCT VFR BLD AUTO: 25.3 % (ref 39–53)
HGB BLD-MCNC: 7.9 GM/DL (ref 13–17.5)
IRON SERPL-MCNC: 21 UG/DL (ref 65–175)
LIPASE SERPL-CCNC: 78 U/L (ref 23–300)
LYMPHOCYTES # SPEC AUTO: 0.9 K/UL (ref 1–4.8)
LYMPHOCYTES NFR SPEC AUTO: 14 %
MCH RBC QN AUTO: 24.7 PG (ref 25–35)
MCHC RBC AUTO-ENTMCNC: 31.1 G/DL (ref 31–37)
MCV RBC AUTO: 79.3 FL (ref 80–100)
MONOCYTES # BLD AUTO: 0.4 K/UL (ref 0–1)
MONOCYTES NFR BLD AUTO: 6 %
NEUTROPHILS # BLD AUTO: 5.1 K/UL (ref 1.3–7.7)
NEUTROPHILS NFR BLD AUTO: 75 %
PLATELET # BLD AUTO: 430 K/UL (ref 150–450)
POTASSIUM SERPL-SCNC: 4 MMOL/L (ref 3.5–5.1)
PROT SERPL-MCNC: 6.6 G/DL (ref 6.3–8.2)
SODIUM SERPL-SCNC: 135 MMOL/L (ref 137–145)
TIBC SERPL-MCNC: 358 UG/DL (ref 228–460)
VIT B12 SERPL-MCNC: 865 PG/ML (ref 200–944)
WBC # BLD AUTO: 6.9 K/UL (ref 3.8–10.6)

## 2022-11-12 RX ADMIN — CEFAZOLIN SCH MLS/HR: 330 INJECTION, POWDER, FOR SOLUTION INTRAMUSCULAR; INTRAVENOUS at 22:02

## 2022-11-12 RX ADMIN — CEFAZOLIN SCH MLS/HR: 330 INJECTION, POWDER, FOR SOLUTION INTRAMUSCULAR; INTRAVENOUS at 05:46

## 2022-11-12 RX ADMIN — HYDROMORPHONE HYDROCHLORIDE PRN MG: 1 INJECTION, SOLUTION INTRAMUSCULAR; INTRAVENOUS; SUBCUTANEOUS at 14:14

## 2022-11-12 RX ADMIN — CEFAZOLIN SCH MLS/HR: 330 INJECTION, POWDER, FOR SOLUTION INTRAMUSCULAR; INTRAVENOUS at 14:22

## 2022-11-12 RX ADMIN — HYDROMORPHONE HYDROCHLORIDE PRN MG: 1 INJECTION, SOLUTION INTRAMUSCULAR; INTRAVENOUS; SUBCUTANEOUS at 10:43

## 2022-11-12 RX ADMIN — HYDROMORPHONE HYDROCHLORIDE PRN MG: 1 INJECTION, SOLUTION INTRAMUSCULAR; INTRAVENOUS; SUBCUTANEOUS at 22:00

## 2022-11-12 RX ADMIN — DOCUSATE SODIUM AND SENNOSIDES SCH EACH: 50; 8.6 TABLET ORAL at 20:03

## 2022-11-12 NOTE — CT
EXAMINATION TYPE: CT abdomen pelvis w con

 

DATE OF EXAM: 11/12/2022

 

COMPARISON: PET/CT scan 3/25/2022

 

HISTORY: ACUTE ABD PAIN, NON LOCALIZED

 

CT DLP: 758.9 mGycm

Automated exposure control for dose reduction was used.

 

CONTRAST: 

Performed with IV Contrast, patient injected with 100 mL of Isovue 300.

 

Images obtained from the diaphragm to the floor the pelvis with IV contrast.

 

Lung bases show some infiltrate at the inferior right pulmonary hilum. There is hiatal hernia. There 
is some thickening of the wall of the esophagus at the gastroesophageal junction. Could relate to luis
or. There is apparent enlarged 2 cm lymph node at the posterior inferior aspect right pulmonary hilum
 adjacent to the esophagus.

 

There are numerous variable sized hypodense foci in the liver that measure up to 3 cm. Spleen is inta
ct. No pancreatic mass. Stomach is intact. The bile ducts are not dilated. Gallbladder is intact.

 

There is no adrenal mass. Kidneys show satisfactory contrast opacification. No hydronephrosis. Ureter
s are not dilated. No retroperitoneal adenopathy. The bladder distends smoothly. No inguinal hernia. 
No free fluid in the pelvis. No pelvic mass.

 

The lumbar vertebrae have normal alignment. No compression fracture. Posterior elements are intact. T
he bony pelvis is intact. The hip joints are intact. There is some mild acetabular spurring.

 

IMPRESSION:

Numerous hypodense liver lesions suggestive of hepatic metastatic disease and are new compared to the
 old exam. There are enlarged paraesophageal lymph nodes at the distal esophagus as well as some wall
 thickening that could relate to tumor. This appears new compared to old exam. There is paraspinal ri
ght lower lobe infiltrate which is new compared to old exam.
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
## 2022-11-13 LAB
ANION GAP SERPL CALC-SCNC: 8.1 MMOL/L (ref 10–18)
BASOPHILS # BLD AUTO: 0.04 X 10*3/UL (ref 0–0.1)
BASOPHILS NFR BLD AUTO: 0.5 %
BUN SERPL-SCNC: 11.6 MG/DL (ref 9–27)
BUN/CREAT SERPL: 15.45 RATIO (ref 12–20)
CALCIUM SPEC-MCNC: 9 MG/DL (ref 8.7–10.3)
CHLORIDE SERPL-SCNC: 97 MMOL/L (ref 96–109)
CO2 SERPL-SCNC: 29.1 MMOL/L (ref 20–27.5)
EOSINOPHIL # BLD AUTO: 0.09 X 10*3/UL (ref 0.04–0.35)
EOSINOPHIL NFR BLD AUTO: 1.1 %
ERYTHROCYTE [DISTWIDTH] IN BLOOD BY AUTOMATED COUNT: 3.46 X 10*6/UL (ref 4.4–5.6)
ERYTHROCYTE [DISTWIDTH] IN BLOOD: 15 % (ref 11.5–14.5)
GLUCOSE SERPL-MCNC: 112 MG/DL (ref 70–110)
HCT VFR BLD AUTO: 27.5 % (ref 39.6–50)
HGB BLD-MCNC: 8.3 G/DL (ref 13–17)
IMM GRANULOCYTES BLD QL AUTO: 0.4 %
LYMPHOCYTES # SPEC AUTO: 0.92 X 10*3/UL (ref 0.9–5)
LYMPHOCYTES NFR SPEC AUTO: 11.7 %
MCH RBC QN AUTO: 24 PG (ref 27–32)
MCHC RBC AUTO-ENTMCNC: 30.2 G/DL (ref 32–37)
MCV RBC AUTO: 79.5 FL (ref 80–97)
MONOCYTES # BLD AUTO: 0.7 X 10*3/UL (ref 0.2–1)
MONOCYTES NFR BLD AUTO: 8.9 %
NEUTROPHILS # BLD AUTO: 6.06 X 10*3/UL (ref 1.8–7.7)
NEUTROPHILS NFR BLD AUTO: 77.4 %
NRBC BLD AUTO-RTO: 0 /100 WBCS (ref 0–0)
PLATELET # BLD AUTO: 437 X 10*3/UL (ref 140–440)
POTASSIUM SERPL-SCNC: 3.9 MMOL/L (ref 3.5–5.5)
SODIUM SERPL-SCNC: 134 MMOL/L (ref 135–145)
WBC # BLD AUTO: 7.84 X 10*3/UL (ref 4.5–10)

## 2022-11-13 RX ADMIN — HYDROMORPHONE HYDROCHLORIDE PRN MG: 1 INJECTION, SOLUTION INTRAMUSCULAR; INTRAVENOUS; SUBCUTANEOUS at 03:49

## 2022-11-13 RX ADMIN — ENOXAPARIN SODIUM SCH MG: 40 INJECTION SUBCUTANEOUS at 08:25

## 2022-11-13 RX ADMIN — OXYCODONE HYDROCHLORIDE SCH MG: 15 TABLET, FILM COATED, EXTENDED RELEASE ORAL at 20:28

## 2022-11-13 RX ADMIN — AZITHROMYCIN SCH MG: 500 TABLET, FILM COATED ORAL at 00:48

## 2022-11-13 RX ADMIN — HYDROMORPHONE HYDROCHLORIDE PRN MG: 1 INJECTION, SOLUTION INTRAMUSCULAR; INTRAVENOUS; SUBCUTANEOUS at 08:46

## 2022-11-13 RX ADMIN — CEFAZOLIN SCH MLS/HR: 330 INJECTION, POWDER, FOR SOLUTION INTRAMUSCULAR; INTRAVENOUS at 20:29

## 2022-11-13 RX ADMIN — AZITHROMYCIN SCH MG: 500 TABLET, FILM COATED ORAL at 08:25

## 2022-11-13 RX ADMIN — HYDROMORPHONE HYDROCHLORIDE PRN MG: 1 INJECTION, SOLUTION INTRAMUSCULAR; INTRAVENOUS; SUBCUTANEOUS at 18:57

## 2022-11-13 RX ADMIN — HYDROMORPHONE HYDROCHLORIDE PRN MG: 1 INJECTION, SOLUTION INTRAMUSCULAR; INTRAVENOUS; SUBCUTANEOUS at 12:38

## 2022-11-13 RX ADMIN — DOCUSATE SODIUM AND SENNOSIDES SCH EACH: 50; 8.6 TABLET ORAL at 08:25

## 2022-11-13 RX ADMIN — CEFAZOLIN SCH MLS/HR: 330 INJECTION, POWDER, FOR SOLUTION INTRAMUSCULAR; INTRAVENOUS at 08:28

## 2022-11-13 RX ADMIN — HYDROMORPHONE HYDROCHLORIDE PRN MG: 1 INJECTION, SOLUTION INTRAMUSCULAR; INTRAVENOUS; SUBCUTANEOUS at 21:59

## 2022-11-13 RX ADMIN — CEFAZOLIN SCH: 330 INJECTION, POWDER, FOR SOLUTION INTRAMUSCULAR; INTRAVENOUS at 06:33

## 2022-11-13 RX ADMIN — DOCUSATE SODIUM AND SENNOSIDES SCH EACH: 50; 8.6 TABLET ORAL at 20:28

## 2022-11-13 NOTE — XR
EXAMINATION TYPE: XR chest 1V

 

DATE OF EXAM: 11/13/2022

 

HISTORY: Shortness of breath.

 

COMPARISON: 6/9/2022

 

TECHNIQUE: Single view of the chest is submitted.

 

FINDINGS:

Demonstrated are scattered senescent parenchymal change.  

 

There is left apical infiltrate as well as linear density at the left lung base which may reflect add
itional infiltrate or atelectasis. The right lung remains clear.

 

The heart is stable.

 

Hilar and mediastinal structures are within normal limits.  

 

Degenerative changes are seen of the dorsal spine. 

 

 IMPRESSION: 

 

1.  There is left apical infiltrate as well as linear density at the left lung base which may reflect
 additional infiltrate or atelectasis. The right lung remains clear.

## 2022-11-13 NOTE — P.PN
Subjective


Progress Note Date: 11/13/22


Principal diagnosis: 





Metastatic esophageal adenocarcinoma





-No acute events overnight


-Notes having bowel movement since starting stool softeners has had improved 

abdominal pain following this


-He denies any dyspnea currently and feels this is improved from initial 

presentation





Objective





- Vital Signs


Vital signs: 


                                   Vital Signs











Temp  97.5 F L  11/13/22 11:15


 


Pulse  82   11/13/22 11:15


 


Resp  18   11/13/22 11:15


 


BP  138/80   11/13/22 11:15


 


Pulse Ox  100   11/13/22 11:15


 


FiO2      








                                 Intake & Output











 11/12/22 11/13/22 11/13/22





 18:59 06:59 18:59


 


Output Total 550  1300


 


Balance -550  -1300


 


Output:   


 


  Urine 550  1300


 


Other:   


 


  Voiding Method  Urinal Urinal


 


  # Voids 3  














- Constitutional


General appearance: Present: average body habitus, cooperative, no acute 

distress





- EENT


Eyes: Present: EOMI





- Respiratory


Respiratory: right: other (Inspiratory crackles appreciated at the right lung 

base)





- Cardiovascular


Rhythm: regular





- Gastrointestinal


General gastrointestinal: Present: hepatomegaly, soft.  Absent: distended, 

tenderness





- Integumentary


Integumentary: Absent: rash





- Neurologic


Neurologic: Present: CNII-XII intact





- Labs


CBC & Chem 7: 


                                 11/13/22 06:07





                                 11/13/22 06:07


Labs: 


                  Abnormal Lab Results - Last 24 Hours (Table)











  11/12/22 11/13/22 11/13/22 Range/Units





  16:17 06:07 06:07 


 


RBC    3.46 L  (4.40-5.60)  X 10*6/uL


 


Hgb    8.3 L  (13.0-17.0)  g/dL


 


Hct    27.5 L  (39.6-50.0)  %


 


MCV    79.5 L  (80.0-97.0)  fL


 


MCH    24.0 L  (27.0-32.0)  pg


 


MCHC    30.2 L  (32.0-37.0)  g/dL


 


RDW    15.0 H  (11.5-14.5)  %


 


MPV    9.0 L  (9.5-12.2)  fL


 


Sodium   134 L   (135-145)  mmol/L


 


Carbon Dioxide   29.1 H   (20.0-27.5)  mmol/L


 


Anion Gap   8.10 L   (10.00-18.00)  mmol/L


 


Glucose   112 H   ()  mg/dL


 


Iron  21 L    ()  ug/dL


 


% Saturation  5.80 L    (15.00-50.00)   


 


Ferritin  18.6 L    (22.0-322.0)  ng/mL








                      Microbiology - Last 24 Hours (Table)











 11/12/22 05:45 Blood Culture - Preliminary





 Blood    No Growth after 24 hours


 


 11/12/22 05:30 Blood Culture - Preliminary





 Blood    No Growth after 24 hours














Assessment and Plan


Assessment: 





Mr. Ovalle is a 56 year old gentleman with metastatic esophageal cancer pre

senting with dyspnea found to have right lower lung base infiltrate on CT 

imaging consistent with pneumonia.


(1) Microcytic anemia


Current Visit: Yes   Status: Acute   Code(s): D50.9 - IRON DEFICIENCY ANEMIA, 

UNSPECIFIED   SNOMED Code(s): 412377499


   





(2) Stage IV adenocarcinoma of esophagus


Current Visit: Yes   Status: Chronic   Code(s): C15.9 - MALIGNANT NEOPLASM OF 

ESOPHAGUS, UNSPECIFIED   SNOMED Code(s): 184350717


   





(3) Pneumonia


Current Visit: Yes   Status: Acute   Code(s): J18.9 - PNEUMONIA, UNSPECIFIED 

ORGANISM   SNOMED Code(s): 216465147


   


Plan: 





#Pneumonia


-Likely CAP


-CT a/p with right lower lobe infiltrate


-Currently on ceftriaxone breathing comfortably on room air





#Microcytic anemia


-Hgb 7.9 with MCV 79.3


-He does not have GI blood loss currently


-Anemia workup on admission consistent for iron deficiency


-We will await for blood cultures been negative for 48 hours prior to initiating

any IV iron.  He should be discharged on oral iron


-No need for transfusion at this time


-Transfuse for Hgb < 7, platelets < or = 10k or bleeding





#Metastatic esophageal cancer


-Previously on FOLFOX and herceptin, last in 4/2022


-Currently transitioning care to [facility]


-No acute oncologic interventions


-Notes he had been taking Norco 10 every 6 hours as needed for abdominal pain, 

but had run out of pain medication at home prior to admission


-Oxycodone extended release 15 mg twice daily added today.  One week supply 

should be given to them on discharge.  He will obtain further scrips from his 

the oncologist [facility] who he is established to see on 11/16/2022


-doc/senna BID added to help with constipation

## 2022-11-14 VITALS
RESPIRATION RATE: 20 BRPM | DIASTOLIC BLOOD PRESSURE: 73 MMHG | SYSTOLIC BLOOD PRESSURE: 116 MMHG | TEMPERATURE: 98.6 F | HEART RATE: 95 BPM

## 2022-11-14 LAB
BASOPHILS # BLD AUTO: 0.05 X 10*3/UL (ref 0–0.1)
BASOPHILS NFR BLD AUTO: 0.7 %
EOSINOPHIL # BLD AUTO: 0.08 X 10*3/UL (ref 0.04–0.35)
EOSINOPHIL NFR BLD AUTO: 1.2 %
ERYTHROCYTE [DISTWIDTH] IN BLOOD BY AUTOMATED COUNT: 3.4 X 10*6/UL (ref 4.4–5.6)
ERYTHROCYTE [DISTWIDTH] IN BLOOD: 14.8 % (ref 11.5–14.5)
HCT VFR BLD AUTO: 26.7 % (ref 39.6–50)
HGB BLD-MCNC: 8.2 G/DL (ref 13–17)
IMM GRANULOCYTES BLD QL AUTO: 0.3 %
LYMPHOCYTES # SPEC AUTO: 0.84 X 10*3/UL (ref 0.9–5)
LYMPHOCYTES NFR SPEC AUTO: 12.5 %
MCH RBC QN AUTO: 24.1 PG (ref 27–32)
MCHC RBC AUTO-ENTMCNC: 30.7 G/DL (ref 32–37)
MCV RBC AUTO: 78.5 FL (ref 80–97)
MONOCYTES # BLD AUTO: 0.68 X 10*3/UL (ref 0.2–1)
MONOCYTES NFR BLD AUTO: 10.1 %
NEUTROPHILS # BLD AUTO: 5.05 X 10*3/UL (ref 1.8–7.7)
NEUTROPHILS NFR BLD AUTO: 75.2 %
NRBC BLD AUTO-RTO: 0 /100 WBCS (ref 0–0)
PLATELET # BLD AUTO: 423 X 10*3/UL (ref 140–440)
WBC # BLD AUTO: 6.72 X 10*3/UL (ref 4.5–10)

## 2022-11-14 RX ADMIN — HYDROMORPHONE HYDROCHLORIDE PRN MG: 1 INJECTION, SOLUTION INTRAMUSCULAR; INTRAVENOUS; SUBCUTANEOUS at 01:56

## 2022-11-14 RX ADMIN — ENOXAPARIN SODIUM SCH MG: 40 INJECTION SUBCUTANEOUS at 08:52

## 2022-11-14 RX ADMIN — CEFAZOLIN SCH MLS/HR: 330 INJECTION, POWDER, FOR SOLUTION INTRAMUSCULAR; INTRAVENOUS at 06:13

## 2022-11-14 RX ADMIN — HYDROMORPHONE HYDROCHLORIDE PRN MG: 1 INJECTION, SOLUTION INTRAMUSCULAR; INTRAVENOUS; SUBCUTANEOUS at 11:19

## 2022-11-14 RX ADMIN — HYDROMORPHONE HYDROCHLORIDE PRN MG: 1 INJECTION, SOLUTION INTRAMUSCULAR; INTRAVENOUS; SUBCUTANEOUS at 06:10

## 2022-11-14 RX ADMIN — DOCUSATE SODIUM AND SENNOSIDES SCH EACH: 50; 8.6 TABLET ORAL at 08:52

## 2022-11-14 RX ADMIN — OXYCODONE HYDROCHLORIDE SCH MG: 15 TABLET, FILM COATED, EXTENDED RELEASE ORAL at 08:51

## 2022-11-14 RX ADMIN — AZITHROMYCIN SCH MG: 500 TABLET, FILM COATED ORAL at 08:51
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
## 2022-12-15 ENCOUNTER — HOSPITAL ENCOUNTER (INPATIENT)
Dept: HOSPITAL 47 - EC | Age: 57
LOS: 6 days | Discharge: HOME | DRG: 177 | End: 2022-12-21
Attending: HOSPITALIST | Admitting: HOSPITALIST
Payer: COMMERCIAL

## 2022-12-15 VITALS — BODY MASS INDEX: 16.1 KG/M2

## 2022-12-15 DIAGNOSIS — Z80.0: ICD-10-CM

## 2022-12-15 DIAGNOSIS — Z80.42: ICD-10-CM

## 2022-12-15 DIAGNOSIS — Z79.899: ICD-10-CM

## 2022-12-15 DIAGNOSIS — Z87.738: ICD-10-CM

## 2022-12-15 DIAGNOSIS — Z87.81: ICD-10-CM

## 2022-12-15 DIAGNOSIS — Z90.81: ICD-10-CM

## 2022-12-15 DIAGNOSIS — R13.10: ICD-10-CM

## 2022-12-15 DIAGNOSIS — Z87.891: ICD-10-CM

## 2022-12-15 DIAGNOSIS — Z88.6: ICD-10-CM

## 2022-12-15 DIAGNOSIS — E43: ICD-10-CM

## 2022-12-15 DIAGNOSIS — Z20.822: ICD-10-CM

## 2022-12-15 DIAGNOSIS — Z92.21: ICD-10-CM

## 2022-12-15 DIAGNOSIS — D64.9: ICD-10-CM

## 2022-12-15 DIAGNOSIS — J69.0: Primary | ICD-10-CM

## 2022-12-15 DIAGNOSIS — F90.9: ICD-10-CM

## 2022-12-15 DIAGNOSIS — E86.0: ICD-10-CM

## 2022-12-15 DIAGNOSIS — C15.9: ICD-10-CM

## 2022-12-15 DIAGNOSIS — F41.9: ICD-10-CM

## 2022-12-15 DIAGNOSIS — F32.A: ICD-10-CM

## 2022-12-15 DIAGNOSIS — Z92.3: ICD-10-CM

## 2022-12-15 LAB
ALBUMIN SERPL-MCNC: 2.8 G/DL (ref 3.5–5)
ALP SERPL-CCNC: 556 U/L (ref 38–126)
ALT SERPL-CCNC: 18 U/L (ref 4–49)
ANION GAP SERPL CALC-SCNC: 5 MMOL/L
APTT BLD: 23.7 SEC (ref 22–30)
AST SERPL-CCNC: 34 U/L (ref 17–59)
BASOPHILS # BLD AUTO: 0 K/UL (ref 0–0.2)
BASOPHILS NFR BLD AUTO: 0 %
BUN SERPL-SCNC: 28 MG/DL (ref 9–20)
CALCIUM SPEC-MCNC: 8.5 MG/DL (ref 8.4–10.2)
CHLORIDE SERPL-SCNC: 96 MMOL/L (ref 98–107)
CO2 SERPL-SCNC: 30 MMOL/L (ref 22–30)
EOSINOPHIL # BLD AUTO: 0.1 K/UL (ref 0–0.7)
EOSINOPHIL NFR BLD AUTO: 1 %
ERYTHROCYTE [DISTWIDTH] IN BLOOD BY AUTOMATED COUNT: 3.9 M/UL (ref 4.3–5.9)
ERYTHROCYTE [DISTWIDTH] IN BLOOD: 14.5 % (ref 11.5–15.5)
GLUCOSE SERPL-MCNC: 112 MG/DL (ref 74–99)
HCT VFR BLD AUTO: 28.9 % (ref 39–53)
HGB BLD-MCNC: 9.2 GM/DL (ref 13–17.5)
INR PPP: 1.1 (ref ?–1.2)
LYMPHOCYTES # SPEC AUTO: 0.9 K/UL (ref 1–4.8)
LYMPHOCYTES NFR SPEC AUTO: 7 %
MAGNESIUM SPEC-SCNC: 1.7 MG/DL (ref 1.6–2.3)
MCH RBC QN AUTO: 23.5 PG (ref 25–35)
MCHC RBC AUTO-ENTMCNC: 31.7 G/DL (ref 31–37)
MCV RBC AUTO: 74.2 FL (ref 80–100)
MONOCYTES # BLD AUTO: 0.8 K/UL (ref 0–1)
MONOCYTES NFR BLD AUTO: 6 %
NEUTROPHILS # BLD AUTO: 11.2 K/UL (ref 1.3–7.7)
NEUTROPHILS NFR BLD AUTO: 85 %
PLATELET # BLD AUTO: 331 K/UL (ref 150–450)
POTASSIUM SERPL-SCNC: 4 MMOL/L (ref 3.5–5.1)
PROT SERPL-MCNC: 6.2 G/DL (ref 6.3–8.2)
PT BLD: 11.2 SEC (ref 9–12)
SODIUM SERPL-SCNC: 131 MMOL/L (ref 137–145)
WBC # BLD AUTO: 13.1 K/UL (ref 3.8–10.6)

## 2022-12-15 PROCEDURE — 87636 SARSCOV2 & INF A&B AMP PRB: CPT

## 2022-12-15 PROCEDURE — 80053 COMPREHEN METABOLIC PANEL: CPT

## 2022-12-15 PROCEDURE — 85610 PROTHROMBIN TIME: CPT

## 2022-12-15 PROCEDURE — 96375 TX/PRO/DX INJ NEW DRUG ADDON: CPT

## 2022-12-15 PROCEDURE — 99285 EMERGENCY DEPT VISIT HI MDM: CPT

## 2022-12-15 PROCEDURE — 83605 ASSAY OF LACTIC ACID: CPT

## 2022-12-15 PROCEDURE — 71045 X-RAY EXAM CHEST 1 VIEW: CPT

## 2022-12-15 PROCEDURE — 43246 EGD PLACE GASTROSTOMY TUBE: CPT

## 2022-12-15 PROCEDURE — 84100 ASSAY OF PHOSPHORUS: CPT

## 2022-12-15 PROCEDURE — 87040 BLOOD CULTURE FOR BACTERIA: CPT

## 2022-12-15 PROCEDURE — 96365 THER/PROPH/DIAG IV INF INIT: CPT

## 2022-12-15 PROCEDURE — 36415 COLL VENOUS BLD VENIPUNCTURE: CPT

## 2022-12-15 PROCEDURE — 83735 ASSAY OF MAGNESIUM: CPT

## 2022-12-15 PROCEDURE — 81003 URINALYSIS AUTO W/O SCOPE: CPT

## 2022-12-15 PROCEDURE — 93005 ELECTROCARDIOGRAM TRACING: CPT

## 2022-12-15 PROCEDURE — 96361 HYDRATE IV INFUSION ADD-ON: CPT

## 2022-12-15 PROCEDURE — 80048 BASIC METABOLIC PNL TOTAL CA: CPT

## 2022-12-15 PROCEDURE — 84145 PROCALCITONIN (PCT): CPT

## 2022-12-15 PROCEDURE — 85730 THROMBOPLASTIN TIME PARTIAL: CPT

## 2022-12-15 PROCEDURE — 96366 THER/PROPH/DIAG IV INF ADDON: CPT

## 2022-12-15 PROCEDURE — 85025 COMPLETE CBC W/AUTO DIFF WBC: CPT

## 2022-12-15 PROCEDURE — 71046 X-RAY EXAM CHEST 2 VIEWS: CPT

## 2022-12-15 RX ADMIN — CEFAZOLIN SCH MLS/HR: 330 INJECTION, POWDER, FOR SOLUTION INTRAMUSCULAR; INTRAVENOUS at 21:29

## 2022-12-15 NOTE — XR
EXAMINATION TYPE: XR chest 2V

 

DATE OF EXAM: 12/15/2022

 

COMPARISON: 11/13/2022

 

INDICATION: Weakness, short of breath

 

TECHNIQUE:  Frontal and lateral views of the chest are obtained.

 

FINDINGS:  

The heart size is normal.  

The pulmonary vasculature is normal.

There is mild diffuse increased lung markings. This most focal in the right lower lobe at the anterio
r fifth rib end level. Correlate for atelectasis or pneumonia. Follow-up is recommended.  Port is pre
sent on the right with tip in the superior vena cava region.

 

IMPRESSION:  

1. Small focal area of increased infiltrate right lower lung field with mild diffuse increased lung m
arkings. Correlate for atelectasis and pneumonia. Consider atypical pneumonia.
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
## 2022-12-16 LAB
PH UR: 5.5 [PH] (ref 5–8)
SP GR UR: 1.01 (ref 1–1.03)
UROBILINOGEN UR QL STRIP: <2 MG/DL (ref ?–2)

## 2022-12-16 PROCEDURE — 0DH63UZ INSERTION OF FEEDING DEVICE INTO STOMACH, PERCUTANEOUS APPROACH: ICD-10-PCS

## 2022-12-16 PROCEDURE — 3E0G76Z INTRODUCTION OF NUTRITIONAL SUBSTANCE INTO UPPER GI, VIA NATURAL OR ARTIFICIAL OPENING: ICD-10-PCS

## 2022-12-16 RX ADMIN — HYDROMORPHONE HYDROCHLORIDE PRN MG: 1 INJECTION, SOLUTION INTRAMUSCULAR; INTRAVENOUS; SUBCUTANEOUS at 22:42

## 2022-12-16 RX ADMIN — DOCUSATE SODIUM AND SENNOSIDES SCH: 50; 8.6 TABLET ORAL at 20:46

## 2022-12-16 RX ADMIN — HYDROMORPHONE HYDROCHLORIDE PRN MG: 1 INJECTION, SOLUTION INTRAMUSCULAR; INTRAVENOUS; SUBCUTANEOUS at 12:44

## 2022-12-16 RX ADMIN — MORPHINE SULFATE SCH: 15 TABLET, EXTENDED RELEASE ORAL at 17:47

## 2022-12-16 RX ADMIN — PANTOPRAZOLE SODIUM SCH MG: 40 INJECTION, POWDER, FOR SOLUTION INTRAVENOUS at 12:43

## 2022-12-16 RX ADMIN — HYDROMORPHONE HYDROCHLORIDE PRN MG: 1 INJECTION, SOLUTION INTRAMUSCULAR; INTRAVENOUS; SUBCUTANEOUS at 16:57

## 2022-12-16 RX ADMIN — HEPARIN SODIUM SCH UNIT: 5000 INJECTION INTRAVENOUS; SUBCUTANEOUS at 12:43

## 2022-12-16 RX ADMIN — MORPHINE SULFATE SCH: 15 TABLET, EXTENDED RELEASE ORAL at 20:46

## 2022-12-16 RX ADMIN — HEPARIN SODIUM SCH UNIT: 5000 INJECTION INTRAVENOUS; SUBCUTANEOUS at 20:07

## 2022-12-16 RX ADMIN — CEFAZOLIN SCH: 330 INJECTION, POWDER, FOR SOLUTION INTRAMUSCULAR; INTRAVENOUS at 10:01

## 2022-12-16 NOTE — P.PCN
Date of Procedure: 12/16/22


Procedure(s) Performed: 


PREOPERATIVE DIAGNOSIS: Malnutrition, esophageal cancer, dysphagia


POSTOPERATIVE DIAGNOSIS: Same


PROCEDURE: EGD with PEG tube placement


SURGEON: Heather


EBL: Minimal


ANESTHESIA: Sedation


COMPLICATIONS: None


OPERATIVE PROCEDURE: The patient was placed in the supine position on the endos

copy table.  The patient was sedated per anesthesia that time.  The Olympus 

gastroscope was inserted into the oropharynx and passed under direct 

visualization to the region of the duodenum.  No obstruction was seen.  The 

pylorus was widely patent.  The stomach was carefully inspected.  The patient 

had a large mass involving the GE junction extending into the upper 20% of the 

stomach and proximally in the esophagus as well.  There was a narrowing in the 

esophagus however we were able to advance without significant difficulty.  The 

stomach was fully insufflated with air.  The abdominal wall was inspected.  The 

light was seen shining through the abdominal wall in the left upper quadrant.  

This site was chosen for PEG tube placement.  The area was prepped in the usual 

sterile fashion.  This area was then localized with lidocaine.  No air was 

evident when aspirating while advancing the localizing needle into the stomach 

until the stomach was reached.  A small vertical incision was made using the 

scalpel.  The Seldinger needle was advanced into the lumen of the stomach the 

wire was advanced.  The wire was grasped with an endoscopic snare.  The wire was

pulled through the oropharynx.  The catheter was then threaded over the 

guidewire and the guidewire and catheter were pulled anteriorly until the hub of

the PEG tube catheter was seated against the anterior wall the stomach.  The 

circular bolster was applied and tightened down.  The endoscope was then 

readvanced into the stomach.  There was no evidence of any bleeding and there 

was appropriate tightness on the bolster.  The catheter was cut appropriately.  

The dual port feeding adapter was applied.


DISPOSITION: Stable to recovery room

## 2022-12-16 NOTE — HP
HISTORY AND PHYSICAL



CHIEF COMPLAINTS:

Weakness and dysphagia.



HISTORY OF PRESENT ILLNESS:

This 57-year-old gentleman with a past medical history of esophageal cancer, 
GERD,

being followed by Dr.  complaints associated with dysphagia and weakness.  The

patient was evaluated by surgery and a PEG tube inserted by Dr. Romero.  Patient 
closely

monitored at this time.  A chest x-ray was also done in the ER which I reviewed,
showed

possibly a right lower lobe pneumonia also.  There is no history of any fever, 
rigors,

or chills at this time.



PAST MEDICAL HISTORY:

Esophageal cancer, rest of history and rest of the chart is also reviewed.



HOME MEDICATIONS:

Reviewed include MS Contin, dose and rest of medications reviewed.



ALLERGIES:

Aspirin.



FAMILY HISTORY:

History of prostate cancer.



SOCIAL HISTORY:

History of smoking.



REVIEW OF SYSTEMS:

A 14-point review is negative as mentioned earlier.



PHYSICAL EXAMINATION:

VITAL SIGNS:  Pulse is 83, blood pressure 140/80, respirations 16.

HEENT:  Conjunctivae normal.

NECK:  No jugular venous distention.

CARDIOVASCULAR:  S1 and S2 muffled.

RESPIRATORY:  Diminished at the bases, scattered rhonchi and crackles.

ABDOMEN:  Soft, nontender.

LEGS:  No edema, no swelling.

NERVOUS SYSTEM:  No focal deficits..



LABS:

WBC 13.6, other labs are noted.  X-ray noted.



ASSESSMENT:

1. Severe malnutrition with dysphagia, status post PEG tube placement.

2. Esophageal cancer.

3. Right lower pneumonia.

4. History of GERD.

5. Multiple medical issues.



RECOMMENDATIONS:

This 57-year-old gentleman presented with multiple complex medical issues, we 
will

monitor the patient closely.  PEG tube inserted.  We will get dietary 
consultation to

initiate PEG tube feeds closely for surgery.  Pain management.  Empiric 
antibiotics for

the pneumonia otherwise prognosis guarded because of multiple complex medical 
problems.

See orders for details.





MMODL / IJN: [id number] / Job#: [id number]

MTDD
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
## 2022-12-17 LAB
ANION GAP SERPL CALC-SCNC: 12.6 MMOL/L (ref 10–18)
BASOPHILS # BLD AUTO: 0.04 X 10*3/UL (ref 0–0.1)
BASOPHILS NFR BLD AUTO: 0.3 %
BUN SERPL-SCNC: 18.1 MG/DL (ref 9–27)
BUN/CREAT SERPL: 30.17 RATIO (ref 12–20)
CALCIUM SPEC-MCNC: 8.5 MG/DL (ref 8.7–10.3)
CHLORIDE SERPL-SCNC: 97 MMOL/L (ref 96–109)
CO2 SERPL-SCNC: 24.4 MMOL/L (ref 20–27.5)
EOSINOPHIL # BLD AUTO: 0.07 X 10*3/UL (ref 0.04–0.35)
EOSINOPHIL NFR BLD AUTO: 0.5 %
ERYTHROCYTE [DISTWIDTH] IN BLOOD BY AUTOMATED COUNT: 3.52 X 10*6/UL (ref 4.4–5.6)
ERYTHROCYTE [DISTWIDTH] IN BLOOD: 15.8 % (ref 11.5–14.5)
GLUCOSE SERPL-MCNC: 103 MG/DL (ref 70–110)
HCT VFR BLD AUTO: 25.7 % (ref 39.6–50)
HGB BLD-MCNC: 7.9 G/DL (ref 13–17)
IMM GRANULOCYTES BLD QL AUTO: 0.9 %
LYMPHOCYTES # SPEC AUTO: 0.78 X 10*3/UL (ref 0.9–5)
LYMPHOCYTES NFR SPEC AUTO: 5.6 %
MCH RBC QN AUTO: 22.4 PG (ref 27–32)
MCHC RBC AUTO-ENTMCNC: 30.7 G/DL (ref 32–37)
MCV RBC AUTO: 73 FL (ref 80–97)
MONOCYTES # BLD AUTO: 0.8 X 10*3/UL (ref 0.2–1)
MONOCYTES NFR BLD AUTO: 5.8 %
NEUTROPHILS # BLD AUTO: 12.05 X 10*3/UL (ref 1.8–7.7)
NEUTROPHILS NFR BLD AUTO: 86.9 %
NRBC BLD AUTO-RTO: 0 /100 WBCS (ref 0–0)
PLATELET # BLD AUTO: 397 X 10*3/UL (ref 140–440)
POTASSIUM SERPL-SCNC: 3.6 MMOL/L (ref 3.5–5.5)
SODIUM SERPL-SCNC: 134 MMOL/L (ref 135–145)
WBC # BLD AUTO: 13.87 X 10*3/UL (ref 4.5–10)

## 2022-12-17 RX ADMIN — MORPHINE SULFATE SCH: 15 TABLET, EXTENDED RELEASE ORAL at 09:29

## 2022-12-17 RX ADMIN — HYDROMORPHONE HYDROCHLORIDE PRN MG: 1 INJECTION, SOLUTION INTRAMUSCULAR; INTRAVENOUS; SUBCUTANEOUS at 06:10

## 2022-12-17 RX ADMIN — AZITHROMYCIN SCH MG: 500 TABLET, FILM COATED ORAL at 09:23

## 2022-12-17 RX ADMIN — HEPARIN SODIUM SCH UNIT: 5000 INJECTION INTRAVENOUS; SUBCUTANEOUS at 20:01

## 2022-12-17 RX ADMIN — PANTOPRAZOLE SODIUM SCH MG: 40 INJECTION, POWDER, FOR SOLUTION INTRAVENOUS at 09:23

## 2022-12-17 RX ADMIN — HYDROMORPHONE HYDROCHLORIDE PRN MG: 1 INJECTION, SOLUTION INTRAMUSCULAR; INTRAVENOUS; SUBCUTANEOUS at 12:20

## 2022-12-17 RX ADMIN — CEFAZOLIN SCH: 330 INJECTION, POWDER, FOR SOLUTION INTRAMUSCULAR; INTRAVENOUS at 16:49

## 2022-12-17 RX ADMIN — HYDROMORPHONE HYDROCHLORIDE PRN MG: 1 INJECTION, SOLUTION INTRAMUSCULAR; INTRAVENOUS; SUBCUTANEOUS at 15:45

## 2022-12-17 RX ADMIN — DOCUSATE SODIUM AND SENNOSIDES SCH: 50; 8.6 TABLET ORAL at 09:29

## 2022-12-17 RX ADMIN — HYDROMORPHONE HYDROCHLORIDE PRN MG: 1 INJECTION, SOLUTION INTRAMUSCULAR; INTRAVENOUS; SUBCUTANEOUS at 20:04

## 2022-12-17 RX ADMIN — DOCUSATE SODIUM AND SENNOSIDES SCH: 50; 8.6 TABLET ORAL at 20:01

## 2022-12-17 RX ADMIN — MORPHINE SULFATE SCH: 15 TABLET, EXTENDED RELEASE ORAL at 16:49

## 2022-12-17 RX ADMIN — MORPHINE SULFATE SCH: 15 TABLET, EXTENDED RELEASE ORAL at 20:02

## 2022-12-17 RX ADMIN — HYDROMORPHONE HYDROCHLORIDE PRN MG: 1 INJECTION, SOLUTION INTRAMUSCULAR; INTRAVENOUS; SUBCUTANEOUS at 09:20

## 2022-12-17 RX ADMIN — CEFAZOLIN SCH MLS/HR: 330 INJECTION, POWDER, FOR SOLUTION INTRAMUSCULAR; INTRAVENOUS at 01:41

## 2022-12-17 RX ADMIN — HEPARIN SODIUM SCH UNIT: 5000 INJECTION INTRAVENOUS; SUBCUTANEOUS at 09:24

## 2022-12-17 RX ADMIN — HYDROMORPHONE HYDROCHLORIDE PRN MG: 1 INJECTION, SOLUTION INTRAMUSCULAR; INTRAVENOUS; SUBCUTANEOUS at 23:29

## 2022-12-17 RX ADMIN — HYDROMORPHONE HYDROCHLORIDE PRN MG: 1 INJECTION, SOLUTION INTRAMUSCULAR; INTRAVENOUS; SUBCUTANEOUS at 01:46

## 2022-12-17 NOTE — P.PN
Subjective





This is a pleasant 57 years old male with history of esophageal cancer and 

dysphagia since 11/2022, he is following up with Dr. Otero (! soren) at [facility], patient states that he has follow-up appointment with him in 2 

weeks and that his sister has the exact date for his follow-up.  He presents 

because of dyspnea and productive cough and malaise over one week, on chest x-

ray he has some evidence of pneumonia and he was started on ceftriaxone and 

Zithromax but also aspiration is concerned however patient with no fever, he has

mild leukocytosis at 13,000.  Respiratory Viruses are negative.  However 

reportcalcitonin is mildly elevated 0.30.


Patient is not on home oxygen.


Also he is currently on normal saline 75 mL/h


PEG tube was placed by surgery team and start to feed him today at 10 mL/h





Objective





- Vital Signs


Vital signs: 


                                   Vital Signs











Temp  98.1 F   12/17/22 07:00


 


Pulse  78   12/17/22 07:00


 


Resp  17   12/17/22 07:00


 


BP  124/78   12/17/22 07:00


 


Pulse Ox  99   12/17/22 07:00


 


FiO2      








                                 Intake & Output











 12/16/22 12/17/22 12/17/22





 18:59 06:59 18:59


 


Intake Total 200 0 


 


Output Total 350 500 


 


Balance -150 -500 


 


Weight 65.771 kg  65.771 kg


 


Intake:   


 


    


 


  Oral  0 


 


Output:   


 


  Gastric Drainage  500 


 


  Urine 350  


 


Other:   


 


  Voiding Method  Toilet 


 


  # Voids  1 














- Exam





GENERAL: The patient is alert and oriented x3, not in any acute distress. Well 

developed, well nourished. 


HEENT: Pupils are round and equally reacting to light. EOMI. No scleral icterus.

No conjunctival pallor. Normocephalic, atraumatic. No pharyngeal erythema. No 

thyromegaly. 


CARDIOVASCULAR: S1 and S2 present. No murmurs, rubs, or gallops. 


PULMONARY: Chest is clear to auscultation, no wheezing or crackles. 


-ABDOMEN: Soft, nontender, nondistended, normoactive bowel sounds. No palpable 

organomegaly.  PEG tube in a Place


MUSCULOSKELETAL: No joint swelling or deformity. 


EXTREMITIES: No cyanosis, clubbing, or pedal edema. 


NEUROLOGICAL: Gross neurological examination did not reveal any focal deficits. 


SKIN: No rashes. no petechiae.





- Labs


CBC & Chem 7: 


                                 12/17/22 08:01





                                 12/17/22 08:01


Labs: 


                  Abnormal Lab Results - Last 24 Hours (Table)











  12/17/22 12/17/22 12/17/22 Range/Units





  08:01 08:01 08:01 


 


WBC  13.87 H    (4.50-10.00)  X 10*3/uL


 


RBC  3.52 L    (4.40-5.60)  X 10*6/uL


 


Hgb  7.9 L    (13.0-17.0)  g/dL


 


Hct  25.7 L    (39.6-50.0)  %


 


MCV  73.0 L    (80.0-97.0)  fL


 


MCH  22.4 L    (27.0-32.0)  pg


 


MCHC  30.7 L    (32.0-37.0)  g/dL


 


RDW  15.8 H    (11.5-14.5)  %


 


MPV  9.1 L    (9.5-12.2)  fL


 


Immature Gran #  0.13 H    (0.00-0.04)  X 10*3/uL


 


Neutrophils #  12.05 H    (1.80-7.70)  X 10*3/uL


 


Lymphocytes #  0.78 L    (0.90-5.00)  X 10*3/uL


 


Sodium   134 L   (135-145)  mmol/L


 


BUN/Creatinine Ratio   30.17 H   (12.00-20.00)  Ratio


 


Calcium   8.5 L   (8.7-10.3)  mg/dL


 


Procalcitonin    0.30 H  (0.02-0.09)  ng/mL








                      Microbiology - Last 24 Hours (Table)











 12/15/22 19:20 Blood Culture - Preliminary





 Blood    No Growth after 24 hours


 


 12/15/22 19:30 Blood Culture - Preliminary





 Blood    No Growth after 24 hours














Assessment and Plan


Assessment: 





Chronic dysphagia secondary to esophageal cancer status post PEG tube placement


Possible right lower lobe pneumonia, community-acquired.  Rule out aspiration 

pneumonia


Dehydration


Chronic anemia





Plan: 





Continue with ceftriaxone and Zithromax


Continue gentle hydration


Pulmonary team consult 


start PEG tube feedings and advance slowly as tolerated


Surgery team consult


Labs and medication were reviewed..  Continue same treatment.  Continue with 

symptomatic treatment.  Resume home medication.  Monitor labs and vitals.  DVT 

and GI prophylaxis.  Further recommendations as per clinical course of the 

patient


DVT prophylaxis: Subcutaneous heparin


GI Prophylaxis: Ppi


Prognosis is guarded

## 2022-12-17 NOTE — P.PN
Subjective


Progress Note Date: 12/17/22


Principal diagnosis: 





Malnutrition





Patient doing well today.  Says his pain is minimal.  No nausea or vomiting.  

Output from the PEG tube 500 mL since yesterday.





Objective





- Vital Signs


Vital signs: 


                                   Vital Signs











Temp  98.1 F   12/17/22 07:00


 


Pulse  78   12/17/22 07:00


 


Resp  17   12/17/22 07:00


 


BP  124/78   12/17/22 07:00


 


Pulse Ox  99   12/17/22 07:00


 


FiO2      








                                 Intake & Output











 12/16/22 12/17/22 12/17/22





 18:59 06:59 18:59


 


Intake Total 200 0 


 


Output Total 350 500 


 


Balance -150 -500 


 


Weight 65.771 kg  65.771 kg


 


Intake:   


 


    


 


  Oral  0 


 


Output:   


 


  Gastric Drainage  500 


 


  Urine 350  


 


Other:   


 


  Voiding Method  Toilet 


 


  # Voids  1 














- Exam





Abdomen: Soft, nondistended, PEG tube in place





- Labs


CBC & Chem 7: 


                                 12/15/22 18:10





                                 12/15/22 18:10


Labs: 


                      Microbiology - Last 24 Hours (Table)











 12/15/22 19:20 Blood Culture - Preliminary





 Blood    No Growth after 24 hours


 


 12/15/22 19:30 Blood Culture - Preliminary





 Blood    No Growth after 24 hours














Assessment and Plan


(1) Stage IV adenocarcinoma of esophagus


Narrative/Plan: 


Patient doing fairly well after PEG tube placement.  Gastric output noted.  

Seems to be less this morning.  Begin low-volume tube feeds at 10 mL per hour.


Current Visit: No   Status: Chronic   Code(s): C15.9 - MALIGNANT NEOPLASM OF 

ESOPHAGUS, UNSPECIFIED   SNOMED Code(s): 025965004

## 2022-12-18 LAB
BASOPHILS # BLD AUTO: 0.05 X 10*3/UL (ref 0–0.1)
BASOPHILS NFR BLD AUTO: 0.4 %
EOSINOPHIL # BLD AUTO: 0.06 X 10*3/UL (ref 0.04–0.35)
EOSINOPHIL NFR BLD AUTO: 0.5 %
ERYTHROCYTE [DISTWIDTH] IN BLOOD BY AUTOMATED COUNT: 3.93 X 10*6/UL (ref 4.4–5.6)
ERYTHROCYTE [DISTWIDTH] IN BLOOD: 15.6 % (ref 11.5–14.5)
HCT VFR BLD AUTO: 28.5 % (ref 39.6–50)
HGB BLD-MCNC: 8.7 G/DL (ref 13–17)
IMM GRANULOCYTES BLD QL AUTO: 1 %
LYMPHOCYTES # SPEC AUTO: 0.96 X 10*3/UL (ref 0.9–5)
LYMPHOCYTES NFR SPEC AUTO: 7.4 %
MCH RBC QN AUTO: 22.1 PG (ref 27–32)
MCHC RBC AUTO-ENTMCNC: 30.5 G/DL (ref 32–37)
MCV RBC AUTO: 72.5 FL (ref 80–97)
MONOCYTES # BLD AUTO: 0.72 X 10*3/UL (ref 0.2–1)
MONOCYTES NFR BLD AUTO: 5.5 %
NEUTROPHILS # BLD AUTO: 11.11 X 10*3/UL (ref 1.8–7.7)
NEUTROPHILS NFR BLD AUTO: 85.2 %
NRBC BLD AUTO-RTO: 0 /100 WBCS (ref 0–0)
PLATELET # BLD AUTO: 529 X 10*3/UL (ref 140–440)
WBC # BLD AUTO: 13.03 X 10*3/UL (ref 4.5–10)

## 2022-12-18 RX ADMIN — HEPARIN SODIUM SCH UNIT: 5000 INJECTION INTRAVENOUS; SUBCUTANEOUS at 20:30

## 2022-12-18 RX ADMIN — MORPHINE SULFATE SCH MG: 10 SOLUTION ORAL at 12:50

## 2022-12-18 RX ADMIN — DOCUSATE SODIUM AND SENNOSIDES SCH: 50; 8.6 TABLET ORAL at 20:31

## 2022-12-18 RX ADMIN — MORPHINE SULFATE SCH MG: 10 SOLUTION ORAL at 20:30

## 2022-12-18 RX ADMIN — PANTOPRAZOLE SODIUM SCH MG: 40 INJECTION, POWDER, FOR SOLUTION INTRAVENOUS at 07:36

## 2022-12-18 RX ADMIN — CEFAZOLIN SCH MLS/HR: 330 INJECTION, POWDER, FOR SOLUTION INTRAMUSCULAR; INTRAVENOUS at 03:40

## 2022-12-18 RX ADMIN — DOCUSATE SODIUM AND SENNOSIDES SCH: 50; 8.6 TABLET ORAL at 07:37

## 2022-12-18 RX ADMIN — HYDROMORPHONE HYDROCHLORIDE PRN MG: 1 INJECTION, SOLUTION INTRAMUSCULAR; INTRAVENOUS; SUBCUTANEOUS at 10:14

## 2022-12-18 RX ADMIN — HEPARIN SODIUM SCH: 5000 INJECTION INTRAVENOUS; SUBCUTANEOUS at 07:37

## 2022-12-18 RX ADMIN — HYDROMORPHONE HYDROCHLORIDE PRN MG: 1 INJECTION, SOLUTION INTRAMUSCULAR; INTRAVENOUS; SUBCUTANEOUS at 15:35

## 2022-12-18 RX ADMIN — AZITHROMYCIN SCH MG: 500 TABLET, FILM COATED ORAL at 07:36

## 2022-12-18 RX ADMIN — HYDROMORPHONE HYDROCHLORIDE PRN MG: 1 INJECTION, SOLUTION INTRAMUSCULAR; INTRAVENOUS; SUBCUTANEOUS at 07:33

## 2022-12-18 RX ADMIN — MORPHINE SULFATE SCH MG: 10 SOLUTION ORAL at 17:04

## 2022-12-18 RX ADMIN — HYDROMORPHONE HYDROCHLORIDE PRN MG: 1 INJECTION, SOLUTION INTRAMUSCULAR; INTRAVENOUS; SUBCUTANEOUS at 03:39

## 2022-12-18 RX ADMIN — MORPHINE SULFATE SCH: 15 TABLET, EXTENDED RELEASE ORAL at 07:37

## 2022-12-18 NOTE — P.PN
Subjective


Progress Note Date: 12/18/22


Principal diagnosis: 





Malnutrition





Patient doing well today.  Tolerating tube feeds at 40 mL/h.  Denies any nausea 

or vomiting.  Also tolerating liquid diet.  Pain is mild.





Objective





- Vital Signs


Vital signs: 


                                   Vital Signs











Temp  97.3 F L  12/18/22 07:00


 


Pulse  92   12/18/22 07:00


 


Resp  19   12/18/22 07:00


 


BP  138/91   12/18/22 07:00


 


Pulse Ox  98   12/18/22 07:00


 


FiO2      








                                 Intake & Output











 12/17/22 12/18/22 12/18/22





 18:59 06:59 18:59


 


Intake Total 40 238 


 


Output Total  125 200


 


Balance 40 113 -200


 


Weight 65.771 kg 64 kg 


 


Intake:   


 


  Tube Feeding 40 238 


 


Output:   


 


  Urine  125 200


 


Other:   


 


  Voiding Method  Toilet 














- Exam





Abdomen: Soft, nondistended, PEG tube in place





- Labs


CBC & Chem 7: 


                                 12/17/22 08:01





                                 12/17/22 08:01


Labs: 


                  Abnormal Lab Results - Last 24 Hours (Table)











  12/17/22 12/17/22 12/17/22 Range/Units





  08:01 08:01 08:01 


 


WBC  13.87 H    (4.50-10.00)  X 10*3/uL


 


RBC  3.52 L    (4.40-5.60)  X 10*6/uL


 


Hgb  7.9 L    (13.0-17.0)  g/dL


 


Hct  25.7 L    (39.6-50.0)  %


 


MCV  73.0 L    (80.0-97.0)  fL


 


MCH  22.4 L    (27.0-32.0)  pg


 


MCHC  30.7 L    (32.0-37.0)  g/dL


 


RDW  15.8 H    (11.5-14.5)  %


 


MPV  9.1 L    (9.5-12.2)  fL


 


Immature Gran #  0.13 H    (0.00-0.04)  X 10*3/uL


 


Neutrophils #  12.05 H    (1.80-7.70)  X 10*3/uL


 


Lymphocytes #  0.78 L    (0.90-5.00)  X 10*3/uL


 


Sodium   134 L   (135-145)  mmol/L


 


BUN/Creatinine Ratio   30.17 H   (12.00-20.00)  Ratio


 


Calcium   8.5 L   (8.7-10.3)  mg/dL


 


Procalcitonin    0.30 H  (0.02-0.09)  ng/mL








                      Microbiology - Last 24 Hours (Table)











 12/15/22 19:30 Blood Culture - Preliminary





 Blood    No Growth after 48 hours


 


 12/15/22 19:20 Blood Culture - Preliminary





 Blood    No Growth after 48 hours














Assessment and Plan


(1) Stage IV adenocarcinoma of esophagus


Narrative/Plan: 


Continue gradually advancing tube feeds as goal.  Keep head of bed elevated.  

Monitor for aspiration symptoms.


Current Visit: No   Status: Chronic   Code(s): C15.9 - MALIGNANT NEOPLASM OF 

ESOPHAGUS, UNSPECIFIED   SNOMED Code(s): 375925663

## 2022-12-18 NOTE — P.PN
Subjective





This is a pleasant 57 years old male with history of esophageal cancer and 

dysphagia since 11/2022, he is following up with Dr. Otero (! soren) at [facility], patient states that he has follow-up appointment with him in 2 

weeks and that his sister has the exact date for his follow-up.  He presents 

because of dyspnea and productive cough and malaise over one week, on chest x-

ray he has some evidence of pneumonia and he was started on ceftriaxone and 

Zithromax but also aspiration is concerned however patient with no fever, he has

mild leukocytosis at 13,000.  Respiratory Viruses are negative.  However 

reportcalcitonin is mildly elevated 0.30.


Patient is not on home oxygen.


Also he is currently on normal saline 75 mL/h


PEG tube was placed by surgery team and start to feed him today at 10 mL/h 





12/18/2012


Patient PEG tube is in place and tube feeding is increased to 40 mL/h this 

morning and he was tolerating well right now, with planned to go for gall


No much respiratory symptoms will address


Patient remains on ceftriaxone and Zithromax P chest x-ray in the morning


Discontinue IV fluids as PEG tube feeding is a started


WBCs stable at 13,000, hemoglobin stable at 8.7.


Patient has oncologist [facility] that he plans to follow up within 2 

weeks as he states (sister has contact information)











Objective





- Vital Signs


Vital signs: 


                                   Vital Signs











Temp  98.2 F   12/18/22 14:09


 


Pulse  97   12/18/22 14:09


 


Resp  20   12/18/22 14:09


 


BP  123/76   12/18/22 14:09


 


Pulse Ox  97   12/18/22 14:09


 


FiO2      








                                 Intake & Output











 12/17/22 12/18/22 12/18/22





 18:59 06:59 18:59


 


Intake Total 40 238 


 


Output Total  125 200


 


Balance 40 113 -200


 


Weight 65.771 kg 64 kg 


 


Intake:   


 


  Tube Feeding 40 238 


 


Output:   


 


  Urine  125 200


 


Other:   


 


  Voiding Method  Toilet 














- Exam





GENERAL: The patient is alert and oriented x3, not in any acute distress. Well 

developed, well nourished. 


HEENT: Pupils are round and equally reacting to light. EOMI. No scleral icterus.

No conjunctival pallor. Normocephalic, atraumatic. No pharyngeal erythema. No 

thyromegaly. 


CARDIOVASCULAR: S1 and S2 present. No murmurs, rubs, or gallops. 


PULMONARY: Chest is clear to auscultation, no wheezing or crackles. 


-ABDOMEN: Soft, nontender, nondistended, normoactive bowel sounds. No palpable 

organomegaly.  PEG tube in a Place


MUSCULOSKELETAL: No joint swelling or deformity. 


EXTREMITIES: No cyanosis, clubbing, or pedal edema. 


NEUROLOGICAL: Gross neurological examination did not reveal any focal deficits. 


SKIN: No rashes. no petechiae.





- Labs


CBC & Chem 7: 


                                 12/18/22 07:49





                                 12/17/22 08:01


Labs: 


                  Abnormal Lab Results - Last 24 Hours (Table)











  12/18/22 Range/Units





  07:49 


 


WBC  13.03 H  (4.50-10.00)  X 10*3/uL


 


RBC  3.93 L  (4.40-5.60)  X 10*6/uL


 


Hgb  8.7 L  (13.0-17.0)  g/dL


 


Hct  28.5 L  (39.6-50.0)  %


 


MCV  72.5 L  (80.0-97.0)  fL


 


MCH  22.1 L  (27.0-32.0)  pg


 


MCHC  30.5 L  (32.0-37.0)  g/dL


 


RDW  15.6 H  (11.5-14.5)  %


 


Plt Count  529 H  (140-440)  X 10*3/uL


 


MPV  9.0 L  (9.5-12.2)  fL


 


Immature Gran #  0.13 H  (0.00-0.04)  X 10*3/uL


 


Neutrophils #  11.11 H  (1.80-7.70)  X 10*3/uL








                      Microbiology - Last 24 Hours (Table)











 12/15/22 19:30 Blood Culture - Preliminary





 Blood    No Growth after 48 hours


 


 12/15/22 19:20 Blood Culture - Preliminary





 Blood    No Growth after 48 hours














Assessment and Plan


Assessment: 





Chronic dysphagia secondary to esophageal cancer status post PEG tube placement


Possible right lower lobe pneumonia, community-acquired.  Rule out aspiration 

pneumonia


Dehydration


Chronic anemia





Plan: 





Continue with ceftriaxone and Zithromax


Continue IV fluid


Recheck chest x-ray


start PEG tube feedings and advance slowly as tolerated


Surgery team consult


Labs and medication were reviewed..  Continue same treatment.  Continue with 

symptomatic treatment.  Resume home medication.  Monitor labs and vitals.  DVT 

and GI prophylaxis.  Further recommendations as per clinical course of the 

patient


DVT prophylaxis: Subcutaneous heparin


GI Prophylaxis: Ppi


Prognosis is guarded

## 2022-12-19 RX ADMIN — MORPHINE SULFATE SCH MG: 10 SOLUTION ORAL at 08:47

## 2022-12-19 RX ADMIN — HYDROMORPHONE HYDROCHLORIDE PRN MG: 1 INJECTION, SOLUTION INTRAMUSCULAR; INTRAVENOUS; SUBCUTANEOUS at 17:23

## 2022-12-19 RX ADMIN — MORPHINE SULFATE SCH MG: 10 SOLUTION ORAL at 04:56

## 2022-12-19 RX ADMIN — PIPERACILLIN AND TAZOBACTAM SCH MLS/HR: 3; .375 INJECTION, POWDER, FOR SOLUTION INTRAVENOUS at 17:14

## 2022-12-19 RX ADMIN — PANTOPRAZOLE SODIUM SCH MG: 40 INJECTION, POWDER, FOR SOLUTION INTRAVENOUS at 08:46

## 2022-12-19 RX ADMIN — MORPHINE SULFATE SCH MG: 10 SOLUTION ORAL at 12:30

## 2022-12-19 RX ADMIN — MORPHINE SULFATE SCH: 10 SOLUTION ORAL at 17:19

## 2022-12-19 RX ADMIN — HYDROMORPHONE HYDROCHLORIDE PRN MG: 1 INJECTION, SOLUTION INTRAMUSCULAR; INTRAVENOUS; SUBCUTANEOUS at 23:06

## 2022-12-19 RX ADMIN — MORPHINE SULFATE SCH MG: 10 SOLUTION ORAL at 00:15

## 2022-12-19 RX ADMIN — DOCUSATE SODIUM AND SENNOSIDES SCH EACH: 50; 8.6 TABLET ORAL at 17:10

## 2022-12-19 RX ADMIN — HEPARIN SODIUM SCH UNIT: 5000 INJECTION INTRAVENOUS; SUBCUTANEOUS at 08:47

## 2022-12-19 RX ADMIN — AZITHROMYCIN SCH MG: 500 TABLET, FILM COATED ORAL at 08:47

## 2022-12-19 RX ADMIN — MORPHINE SULFATE SCH MG: 10 SOLUTION ORAL at 20:12

## 2022-12-19 RX ADMIN — HYDROMORPHONE HYDROCHLORIDE PRN MG: 1 INJECTION, SOLUTION INTRAMUSCULAR; INTRAVENOUS; SUBCUTANEOUS at 02:21

## 2022-12-19 RX ADMIN — MORPHINE SULFATE SCH MG: 10 SOLUTION ORAL at 17:10

## 2022-12-19 RX ADMIN — HEPARIN SODIUM SCH UNIT: 5000 INJECTION INTRAVENOUS; SUBCUTANEOUS at 20:13

## 2022-12-19 RX ADMIN — DOCUSATE SODIUM AND SENNOSIDES SCH: 50; 8.6 TABLET ORAL at 08:24
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
## 2022-12-19 NOTE — XR
EXAMINATION TYPE: XR chest 1V

 

DATE OF EXAM: 12/19/2022

 

COMPARISON: 12/15/2022

 

HISTORY: Shortness of breath

 

TECHNIQUE: Single frontal view of the chest is obtained.

 

FINDINGS:  Hyperinflation. Mediport catheter seen. Heart size normal. Right perihilar and lower lobe 
area of infiltrate. Subsegmental changes left lung base. No pleural effusion or pneumothorax.

 

IMPRESSION:  

1. Bilateral lower lobe and perihilar area of infiltrate correlate for pneumonia.

## 2022-12-19 NOTE — P.PN
Subjective





This is a pleasant 57 years old male with history of esophageal cancer and 

dysphagia since 11/2022, he is following up with Dr. Otero (! soren) at [facility], patient states that he has follow-up appointment with him in 2 

weeks and that his sister has the exact date for his follow-up.  He presents 

because of dyspnea and productive cough and malaise over one week, on chest x-

ray he has some evidence of pneumonia and he was started on ceftriaxone and 

Zithromax but also aspiration is concerned however patient with no fever, he has

mild leukocytosis at 13,000.  Respiratory Viruses are negative.  However 

reportcalcitonin is mildly elevated 0.30.


Patient is not on home oxygen.


Also he is currently on normal saline 75 mL/h


PEG tube was placed by surgery team and start to feed him today at 10 mL/h 





12/18/2012


Patient PEG tube is in place and tube feeding is increased to 40 mL/h this 

morning and he was tolerating well right now, with planned to go for gall


No much respiratory symptoms will address


Patient remains on ceftriaxone and Zithromax P chest x-ray in the morning


Discontinue IV fluids as PEG tube feeding is a started


WBCs stable at 13,000, hemoglobin stable at 8.7.


Patient has oncologist [facility] that he plans to follow up within 2 

weeks as he states (sister has contact information)





12/19/2022


Patient today was feeling better, while at bed her rest he was denying dyspnea 

or chest pain, occasional coughing requiring Robitussin.  And also patient was 

cleared for discharge by surgery team however repeat chest x-ray showing 

persistent pneumonia especially on the right side therefore we consulted pulmon

vijay team.  His antibiotics was updated from ceftriaxone and to Zosyn.  We will 

keep monitoring.


No labs from today.


proCalcitonin was 0.30





Objective





- Vital Signs


Vital signs: 


                                   Vital Signs











Temp  97.9 F   12/19/22 07:00


 


Pulse  85   12/19/22 07:00


 


Resp  20   12/19/22 07:00


 


BP  120/72   12/19/22 07:00


 


Pulse Ox  95   12/19/22 07:00


 


FiO2      








                                 Intake & Output











 12/18/22 12/19/22 12/19/22





 18:59 06:59 18:59


 


Intake Total 833 1003 


 


Output Total 200 325 


 


Balance 633 678 


 


Weight  57 kg 57 kg


 


Intake:   


 


  Tube Feeding 833 1003 


 


Output:   


 


  Urine 200 325 


 


Other:   


 


  Voiding Method  Toilet 














- Exam





GENERAL: The patient is alert and oriented x3, not in any acute distress. Well 

developed, well nourished. 


HEENT: Pupils are round and equally reacting to light. EOMI. No scleral icterus.

No conjunctival pallor. Normocephalic, atraumatic. No pharyngeal erythema. No 

thyromegaly. 


CARDIOVASCULAR: S1 and S2 present. No murmurs, rubs, or gallops. 


PULMONARY: Chest is clear to auscultation, no wheezing or crackles. 


-ABDOMEN: Soft, nontender, nondistended, normoactive bowel sounds. No palpable 

organomegaly.  PEG tube in a Place


MUSCULOSKELETAL: No joint swelling or deformity. 


EXTREMITIES: No cyanosis, clubbing, or pedal edema. 


NEUROLOGICAL: Gross neurological examination did not reveal any focal deficits. 


SKIN: No rashes. no petechiae.





- Labs


CBC & Chem 7: 


                                 12/18/22 07:49





                                 12/17/22 08:01


Labs: 


                      Microbiology - Last 24 Hours (Table)











 12/15/22 19:20 Blood Culture - Preliminary





 Blood    No Growth after 72 hours


 


 12/15/22 19:30 Blood Culture - Preliminary





 Blood    No Growth after 72 hours














Assessment and Plan


Assessment: 





Chronic dysphagia secondary to esophageal cancer status post PEG tube placement


Possible right lower lobe pneumonia, community-acquired.  Rule out aspiration 

pneumonia


Dehydration


Chronic anemia





Plan: 





Continue with Zosyn


discontinue IV fluid


start PEG tube feedings and advance slowly as tolerated


Surgery team consult


Labs and medication were reviewed..  Continue same treatment.  Continue with 

symptomatic treatment.  Resume home medication.  Monitor labs and vitals.  DVT 

and GI prophylaxis.  Further recommendations as per clinical course of the 

patient


DVT prophylaxis: Subcutaneous heparin


GI Prophylaxis: Ppi


Prognosis is guarded

## 2022-12-19 NOTE — P.PN
Subjective


Progress Note Date: 12/19/22


Principal diagnosis: 





Malnutrition





Patient doing well today.  Denies abdominal pain.  Tube feeds were held last 

night for short while because of blood within the gastric aspirate.  The volume 

of residuals was low however.  The tube feeds were subsequently restarted.  He 

is tolerating tube feeds at goal currently.





Objective





- Vital Signs


Vital signs: 


                                   Vital Signs











Temp  97.9 F   12/19/22 07:00


 


Pulse  85   12/19/22 07:00


 


Resp  20   12/19/22 07:00


 


BP  120/72   12/19/22 07:00


 


Pulse Ox  95   12/19/22 07:00


 


FiO2      








                                 Intake & Output











 12/18/22 12/19/22 12/19/22





 18:59 06:59 18:59


 


Intake Total 833 1003 


 


Output Total 200 325 


 


Balance 633 678 


 


Weight  57 kg 


 


Intake:   


 


  Tube Feeding 833 1003 


 


Output:   


 


  Urine 200 325 


 


Other:   


 


  Voiding Method  Toilet 














- Exam





Abdomen: Soft, nondistended, bolster loose and slightly, nontender





- Labs


CBC & Chem 7: 


                                 12/18/22 07:49





                                 12/17/22 08:01


Labs: 


                  Abnormal Lab Results - Last 24 Hours (Table)











  12/18/22 Range/Units





  07:49 


 


WBC  13.03 H  (4.50-10.00)  X 10*3/uL


 


RBC  3.93 L  (4.40-5.60)  X 10*6/uL


 


Hgb  8.7 L  (13.0-17.0)  g/dL


 


Hct  28.5 L  (39.6-50.0)  %


 


MCV  72.5 L  (80.0-97.0)  fL


 


MCH  22.1 L  (27.0-32.0)  pg


 


MCHC  30.5 L  (32.0-37.0)  g/dL


 


RDW  15.6 H  (11.5-14.5)  %


 


Plt Count  529 H  (140-440)  X 10*3/uL


 


MPV  9.0 L  (9.5-12.2)  fL


 


Immature Gran #  0.13 H  (0.00-0.04)  X 10*3/uL


 


Neutrophils #  11.11 H  (1.80-7.70)  X 10*3/uL








                      Microbiology - Last 24 Hours (Table)











 12/15/22 19:20 Blood Culture - Preliminary





 Blood    No Growth after 72 hours


 


 12/15/22 19:30 Blood Culture - Preliminary





 Blood    No Growth after 72 hours














Assessment and Plan


(1) Stage IV adenocarcinoma of esophagus


Narrative/Plan: 


Patient doing well at this time.  Tolerating tube feeds at goal.  Continue 

elevating head when utilizing PEG tube.  Upon discharge may consider bolus 

feeds.  We'll sign off at this point.  I will be out of town.  Please reconsult 

service if needed.


Current Visit: No   Status: Chronic   Code(s): C15.9 - MALIGNANT NEOPLASM OF 

ESOPHAGUS, UNSPECIFIED   SNOMED Code(s): 280936841

## 2022-12-20 RX ADMIN — MORPHINE SULFATE SCH: 10 SOLUTION ORAL at 05:28

## 2022-12-20 RX ADMIN — MORPHINE SULFATE SCH: 10 SOLUTION ORAL at 12:34

## 2022-12-20 RX ADMIN — HYDROMORPHONE HYDROCHLORIDE PRN MG: 1 INJECTION, SOLUTION INTRAMUSCULAR; INTRAVENOUS; SUBCUTANEOUS at 08:34

## 2022-12-20 RX ADMIN — HYDROMORPHONE HYDROCHLORIDE PRN MG: 1 INJECTION, SOLUTION INTRAMUSCULAR; INTRAVENOUS; SUBCUTANEOUS at 20:08

## 2022-12-20 RX ADMIN — MORPHINE SULFATE SCH: 10 SOLUTION ORAL at 14:02

## 2022-12-20 RX ADMIN — HEPARIN SODIUM SCH UNIT: 5000 INJECTION INTRAVENOUS; SUBCUTANEOUS at 08:32

## 2022-12-20 RX ADMIN — PIPERACILLIN AND TAZOBACTAM SCH MLS/HR: 3; .375 INJECTION, POWDER, FOR SOLUTION INTRAVENOUS at 01:25

## 2022-12-20 RX ADMIN — DOCUSATE SODIUM AND SENNOSIDES SCH EACH: 50; 8.6 TABLET ORAL at 20:08

## 2022-12-20 RX ADMIN — HYDROMORPHONE HYDROCHLORIDE PRN MG: 1 INJECTION, SOLUTION INTRAMUSCULAR; INTRAVENOUS; SUBCUTANEOUS at 12:38

## 2022-12-20 RX ADMIN — PANTOPRAZOLE SODIUM SCH MG: 40 INJECTION, POWDER, FOR SOLUTION INTRAVENOUS at 08:33

## 2022-12-20 RX ADMIN — AMOXICILLIN AND CLAVULANATE POTASSIUM SCH EACH: 875; 125 TABLET, FILM COATED ORAL at 20:08

## 2022-12-20 RX ADMIN — HEPARIN SODIUM SCH UNIT: 5000 INJECTION INTRAVENOUS; SUBCUTANEOUS at 20:09

## 2022-12-20 RX ADMIN — AMOXICILLIN AND CLAVULANATE POTASSIUM SCH EACH: 875; 125 TABLET, FILM COATED ORAL at 08:32

## 2022-12-20 RX ADMIN — MORPHINE SULFATE SCH: 10 SOLUTION ORAL at 20:08

## 2022-12-20 RX ADMIN — MORPHINE SULFATE SCH MG: 10 SOLUTION ORAL at 01:24

## 2022-12-20 RX ADMIN — MORPHINE SULFATE SCH: 10 SOLUTION ORAL at 17:35

## 2022-12-20 RX ADMIN — DOCUSATE SODIUM AND SENNOSIDES SCH EACH: 50; 8.6 TABLET ORAL at 08:33

## 2022-12-20 RX ADMIN — HYDROMORPHONE HYDROCHLORIDE PRN MG: 1 INJECTION, SOLUTION INTRAMUSCULAR; INTRAVENOUS; SUBCUTANEOUS at 23:06

## 2022-12-20 RX ADMIN — HYDROMORPHONE HYDROCHLORIDE PRN MG: 1 INJECTION, SOLUTION INTRAMUSCULAR; INTRAVENOUS; SUBCUTANEOUS at 05:31

## 2022-12-20 RX ADMIN — HYDROMORPHONE HYDROCHLORIDE PRN MG: 1 INJECTION, SOLUTION INTRAMUSCULAR; INTRAVENOUS; SUBCUTANEOUS at 16:54

## 2022-12-20 NOTE — P.PN
Subjective





This is a pleasant 57 years old male with history of esophageal cancer and 

dysphagia since 11/2022, he is following up with Dr. Otero (! soren) at [facility], patient states that he has follow-up appointment with him in 2 

weeks and that his sister has the exact date for his follow-up.  He presents 

because of dyspnea and productive cough and malaise over one week, on chest x-

ray he has some evidence of pneumonia and he was started on ceftriaxone and 

Zithromax but also aspiration is concerned however patient with no fever, he has

mild leukocytosis at 13,000.  Respiratory Viruses are negative.  However 

reportcalcitonin is mildly elevated 0.30.


Patient is not on home oxygen.


Also he is currently on normal saline 75 mL/h


PEG tube was placed by surgery team and start to feed him today at 10 mL/h 





12/18/2012


Patient PEG tube is in place and tube feeding is increased to 40 mL/h this 

morning and he was tolerating well right now, with planned to go for gall


No much respiratory symptoms will address


Patient remains on ceftriaxone and Zithromax P chest x-ray in the morning


Discontinue IV fluids as PEG tube feeding is a started


WBCs stable at 13,000, hemoglobin stable at 8.7.


Patient has oncologist [facility] that he plans to follow up within 2 

weeks as he states (sister has contact information)





12/19/2022


Patient today was feeling better, while at bed her rest he was denying dyspnea 

or chest pain, occasional coughing requiring Robitussin.  And also patient was 

cleared for discharge by surgery team however repeat chest x-ray showing 

persistent pneumonia especially on the right side therefore we consulted pulmon

vijay team.  His antibiotics was updated from ceftriaxone and to Zosyn.  We will 

keep monitoring.


No labs from today.


proCalcitonin was 0.30





12/20/2022


Patient generally is doing well he has no new symptoms.  No respiratory 

symptoms.


He is saturating well on room air.


Patient was cleared for discharge by both surgery team and pulmonary team today,

he is going to be discharged on oral Augmentin.


However patient has PEG tube placed during this admission with tube feeding is 

running and patient tolerance that's well however his medical insurance provider

is refusing to provide coverage for his tube feedings supply because he passed a

swallow evaluation.  PEG tube was recommended and placed by GI service for his 

advanced esophageal cancer and aspiration pneumonia.


 on the case, and case was discussed with her





Objective





- Vital Signs


Vital signs: 


                                   Vital Signs











Temp  98.1 F   12/20/22 14:36


 


Pulse  79   12/20/22 14:36


 


Resp  16   12/20/22 14:36


 


BP  137/83   12/20/22 14:36


 


Pulse Ox  100   12/20/22 14:36


 


FiO2      








                                 Intake & Output











 12/19/22 12/20/22 12/20/22





 18:59 06:59 18:59


 


Intake Total   200


 


Output Total 350  


 


Balance -350  200


 


Weight 57 kg  66.2 kg


 


Intake:   


 


  Oral   200


 


Output:   


 


  Urine 350  


 


Other:   


 


  Voiding Method  Toilet Toilet





  Urinal Urinal














- Exam





GENERAL: The patient is alert and oriented x3, not in any acute distress. Well 

developed, well nourished. 


HEENT: Pupils are round and equally reacting to light. EOMI. No scleral icterus.

No conjunctival pallor. Normocephalic, atraumatic. No pharyngeal erythema. No 

thyromegaly. 


CARDIOVASCULAR: S1 and S2 present. No murmurs, rubs, or gallops. 


PULMONARY: Chest is clear to auscultation, no wheezing or crackles. 


-ABDOMEN: Soft, nontender, nondistended, normoactive bowel sounds. No palpable 

organomegaly.  PEG tube in a Place


MUSCULOSKELETAL: No joint swelling or deformity. 


EXTREMITIES: No cyanosis, clubbing, or pedal edema. 


NEUROLOGICAL: Gross neurological examination did not reveal any focal deficits. 


SKIN: No rashes. no petechiae.





- Labs


CBC & Chem 7: 


                                 12/18/22 07:49





                                 12/17/22 08:01


Labs: 


                      Microbiology - Last 24 Hours (Table)











 12/15/22 19:30 Blood Culture - Preliminary





 Blood    No Growth after 96 hours


 


 12/15/22 19:20 Blood Culture - Preliminary





 Blood    No Growth after 96 hours














Assessment and Plan


Assessment: 





Chronic dysphagia secondary to esophageal cancer status post PEG tube placement


most likely patient has aspiration pneumonia


Dehydration, improved


Esophageal cancer with dysphagia


Chronic anemia





Plan: 





patient is cleared for discharge by pulmonary service on Augmentin


discontinue IV fluid


continue with PEG tube feeding


Patient is medically stable for discharge pending his insurance provider to 

improve his PEG tube feeding supplies


Surgery team consultwho cleared the patient for discharge


Labs and medication were reviewed..  Continue same treatment.  Continue with sym

ptomatic treatment.  Resume home medication.  Monitor labs and vitals.  DVT and 

GI prophylaxis.  Further recommendations as per clinical course of the patient


DVT prophylaxis: Subcutaneous heparin


GI Prophylaxis: Ppi


Prognosis is guarded
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
## 2022-12-21 VITALS
HEART RATE: 80 BPM | TEMPERATURE: 98.2 F | DIASTOLIC BLOOD PRESSURE: 87 MMHG | RESPIRATION RATE: 16 BRPM | SYSTOLIC BLOOD PRESSURE: 135 MMHG

## 2022-12-21 RX ADMIN — HYDROMORPHONE HYDROCHLORIDE PRN MG: 1 INJECTION, SOLUTION INTRAMUSCULAR; INTRAVENOUS; SUBCUTANEOUS at 11:52

## 2022-12-21 RX ADMIN — HYDROMORPHONE HYDROCHLORIDE PRN MG: 1 INJECTION, SOLUTION INTRAMUSCULAR; INTRAVENOUS; SUBCUTANEOUS at 02:01

## 2022-12-21 RX ADMIN — MORPHINE SULFATE SCH: 10 SOLUTION ORAL at 04:31

## 2022-12-21 RX ADMIN — HEPARIN SODIUM SCH UNIT: 5000 INJECTION INTRAVENOUS; SUBCUTANEOUS at 08:02

## 2022-12-21 RX ADMIN — HYDROMORPHONE HYDROCHLORIDE PRN MG: 1 INJECTION, SOLUTION INTRAMUSCULAR; INTRAVENOUS; SUBCUTANEOUS at 07:58

## 2022-12-21 RX ADMIN — PANTOPRAZOLE SODIUM SCH MG: 40 INJECTION, POWDER, FOR SOLUTION INTRAVENOUS at 08:01

## 2022-12-21 RX ADMIN — MORPHINE SULFATE SCH MG: 10 SOLUTION ORAL at 09:15

## 2022-12-21 RX ADMIN — HYDROMORPHONE HYDROCHLORIDE PRN MG: 1 INJECTION, SOLUTION INTRAMUSCULAR; INTRAVENOUS; SUBCUTANEOUS at 04:39

## 2022-12-21 RX ADMIN — DOCUSATE SODIUM AND SENNOSIDES SCH EACH: 50; 8.6 TABLET ORAL at 08:01

## 2022-12-21 RX ADMIN — AMOXICILLIN AND CLAVULANATE POTASSIUM SCH EACH: 875; 125 TABLET, FILM COATED ORAL at 08:01

## 2022-12-21 RX ADMIN — MORPHINE SULFATE SCH: 10 SOLUTION ORAL at 00:20
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
## 2022-12-22 NOTE — P.DS
Providers


Date of admission: 


12/20/22 13:18





Attending physician: 


Riley Bruno





Consults: 





                                        





12/15/22 20:12


Consult Physician Urgent 


   Consulting Provider: Yamil Romeor


   Consult Reason/Comments: eval for J tube - stage IV esophageal cancer


   Do you want consulting provider notified?: Yes





12/19/22 10:20


Consult Physician Urgent 


   Consulting Provider: Keeley Sy


   Consult Reason/Comments: pna


   Do you want consulting provider notified?: Yes











Primary care physician: 


[facility] Course: 





Diagnoses:


Chronic dysphagia secondary to esophageal cancer status post PEG tube placement


most likely patient has aspiration pneumonia


Dehydration, improved


Esophageal cancer with dysphagia


Chronic anemia











Hospital course:


This is a pleasant 57 years old male with history of esophageal cancer and 

dysphagia since 11/2022, he is following up with Dr. Otero (! soren) at [facility], patient states that he has follow-up appointment with him in 2 

weeks and that his sister has the exact date for his follow-up.  He presents 

because of dyspnea and productive cough and malaise over one week, on chest x-

ray he has some evidence of pneumonia and he was started on ceftriaxone and 

Zithromax but also aspiration is concerned however patient with no fever, he has

mild leukocytosis at 13,000.  Respiratory Viruses are negative.  However 

reportcalcitonin is mildly elevated 0.30.  Patient showed interval improvement 

with antibiotic.  Pulmonary team on the case


Also patient evaluated by GI team for his dysphagia related to his esophageal 

cancer with PEG tube placed on tube feeding started


Patient tolerated procedure well and on the day of discharge his back to his 

baseline denies any other symptoms, no chest pain or dyspnea, no urinary or GI 

symptoms.


Patient is cleared to go home today by surgery and pulmonary team


His supply for PEG tube is already delivered to his house as patient states 

today


Problems and management plan were discussed with the patient and he verbalized 

understanding and acceptance


Patient was found stable and can be discharged home in guarded prognosis however

he needs follow-up as an outpatient. Patient was instructed to follow up with 

PCP Dr. smith within one week and patient agrees


Patient was instructed to follow up with pulmonologist Dr. Malcolm in 1-2 weeks

and surgeon Dr. Burris 2 weeks and he agrees





Physical exam


Gen: patient is a AAOx3, no distress


CVS: S1-S2, RRR, no murmur


Lungs: B/L CTA, no wheezing


Abdomen: soft, no distention, no tenderness, positive bowel sounds


Extremity: no leg edema or induration





Time spent more than 35 minutes





Patient Condition at Discharge: Fair





Plan - Discharge Summary


Discharge Rx Participant: No


New Discharge Prescriptions: 


New


   RX: Amoxic-Pot Clav 875-125Mg [Augmentin 875-125] 1 each PO Q12HR 7 Days #14 

tab





Continue


   RX: Prochlorperazine [Compazine] 10 mg PO Q8H PRN


     PRN Reason: Nausea And Vomiting


   RX: Ondansetron Odt [Zofran ODT] 8 mg PO Q8HR PRN


     PRN Reason: Nausea And Vomiting


   RX: HYDROcodone/APAP 7.5-325MG [Norco 7.5-325] 1 tab PO Q4H PRN


     PRN Reason: Pain


   RX: Sennosides/Docusate Sodium [Senna-S 8.6-50 mg Tablet] 1 tab PO BID


   RX: Pantoprazole Sodium [Protonix] 40 mg PO BID


   RX: Morphine Sulfate ER [Ms Contin] 15 mg PO TID


Discharge Medication List





RX: HYDROcodone/APAP 7.5-325MG [Norco 7.5-325] 1 tab PO Q4H PRN 12/15/22 

[History]


RX: Morphine Sulfate ER [Ms Contin] 15 mg PO TID 12/15/22 [History]


RX: Ondansetron Odt [Zofran ODT] 8 mg PO Q8HR PRN 12/15/22 [History]


RX: Pantoprazole Sodium [Protonix] 40 mg PO BID 12/15/22 [History]


RX: Prochlorperazine [Compazine] 10 mg PO Q8H PRN 12/15/22 [History]


RX: Sennosides/Docusate Sodium [Senna-S 8.6-50 mg Tablet] 1 tab PO BID 12/15/22 

[History]


RX: Amoxic-Pot Clav 875-125Mg [Augmentin 875-125] 1 each PO Q12HR 7 Days #14 tab

12/20/22 [Rx]








Follow up Appointment(s)/Referral(s): 


Yamil Romero MD [Medical Doctor] - 2 Weeks (please call for an appointment )


Keeley Sy MD [STAFF PHYSICIAN] - 01/16/23 1:00 pm


Mariana Brito MD [Primary Care Provider] - 1-2 days (please call for an 

appointment )


Carlyle Hubbard [NON-STAFF] - 1 Week


Patient Instructions/Handouts:  Community Acquired Pneumonia (DC)


Activity/Diet/Wound Care/Special Instructions: 


 continue with PEG tube feeding





Activity is restricted till you see your doctor 





Discharge Disposition: HOME SELF-CARE

## 2023-01-16 ENCOUNTER — HOSPITAL ENCOUNTER (INPATIENT)
Dept: HOSPITAL 47 - EC | Age: 58
LOS: 1 days | Discharge: HOSPICE | DRG: 871 | End: 2023-01-17
Payer: COMMERCIAL

## 2023-01-16 VITALS — BODY MASS INDEX: 17.9 KG/M2

## 2023-01-16 DIAGNOSIS — Z92.3: ICD-10-CM

## 2023-01-16 DIAGNOSIS — Z66: ICD-10-CM

## 2023-01-16 DIAGNOSIS — F41.9: ICD-10-CM

## 2023-01-16 DIAGNOSIS — N17.9: ICD-10-CM

## 2023-01-16 DIAGNOSIS — E87.1: ICD-10-CM

## 2023-01-16 DIAGNOSIS — Z20.822: ICD-10-CM

## 2023-01-16 DIAGNOSIS — C78.7: ICD-10-CM
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
## 2023-01-16 DIAGNOSIS — C78.00: ICD-10-CM

## 2023-01-16 DIAGNOSIS — A41.9: Primary | ICD-10-CM

## 2023-01-16 DIAGNOSIS — D64.81: ICD-10-CM

## 2023-01-16 DIAGNOSIS — K22.89: ICD-10-CM

## 2023-01-16 DIAGNOSIS — F90.9: ICD-10-CM

## 2023-01-16 DIAGNOSIS — E87.70: ICD-10-CM

## 2023-01-16 DIAGNOSIS — F32.A: ICD-10-CM

## 2023-01-16 DIAGNOSIS — K22.3: ICD-10-CM

## 2023-01-16 DIAGNOSIS — I48.91: ICD-10-CM

## 2023-01-16 DIAGNOSIS — E87.8: ICD-10-CM

## 2023-01-16 DIAGNOSIS — Z51.5: ICD-10-CM

## 2023-01-16 DIAGNOSIS — R65.20: ICD-10-CM

## 2023-01-16 DIAGNOSIS — Z79.899: ICD-10-CM

## 2023-01-16 DIAGNOSIS — D61.1: ICD-10-CM

## 2023-01-16 DIAGNOSIS — C15.9: ICD-10-CM

## 2023-01-16 DIAGNOSIS — J94.2: ICD-10-CM

## 2023-01-16 DIAGNOSIS — E87.20: ICD-10-CM

## 2023-01-16 DIAGNOSIS — Z92.21: ICD-10-CM

## 2023-01-16 DIAGNOSIS — I47.20: ICD-10-CM

## 2023-01-16 DIAGNOSIS — T38.6X5A: ICD-10-CM

## 2023-01-16 LAB
ALBUMIN SERPL-MCNC: 2.4 G/DL (ref 3.5–5)
ALP SERPL-CCNC: 471 U/L (ref 38–126)
ALT SERPL-CCNC: 26 U/L (ref 4–49)
AMYLASE SERPL-CCNC: 57 U/L (ref 30–110)
ANION GAP SERPL CALC-SCNC: 10 MMOL/L
APTT BLD: 28.4 SEC (ref 22–30)
AST SERPL-CCNC: 47 U/L (ref 17–59)
BASOPHILS # BLD AUTO: 0.1 K/UL (ref 0–0.2)
BASOPHILS NFR BLD AUTO: 0 %
BUN SERPL-SCNC: 48 MG/DL (ref 9–20)
CALCIUM SPEC-MCNC: 7.8 MG/DL (ref 8.4–10.2)
CHLORIDE SERPL-SCNC: 93 MMOL/L (ref 98–107)
CO2 SERPL-SCNC: 27 MMOL/L (ref 22–30)
EOSINOPHIL # BLD AUTO: 0.1 K/UL (ref 0–0.7)
EOSINOPHIL NFR BLD AUTO: 0 %
ERYTHROCYTE [DISTWIDTH] IN BLOOD BY AUTOMATED COUNT: 3.16 M/UL (ref 4.3–5.9)
ERYTHROCYTE [DISTWIDTH] IN BLOOD: 17.5 % (ref 11.5–15.5)
GLUCOSE SERPL-MCNC: 110 MG/DL (ref 74–99)
HCT VFR BLD AUTO: 22.4 % (ref 39–53)
HGB BLD-MCNC: 6.6 GM/DL (ref 13–17.5)
INR PPP: 1.3 (ref ?–1.2)
LACTATE BLDV-SCNC: 2.7 MMOL/L (ref 0.7–2)
LIPASE SERPL-CCNC: 353 U/L (ref 23–300)
LYMPHOCYTES # SPEC AUTO: 0.4 K/UL (ref 1–4.8)
LYMPHOCYTES NFR SPEC AUTO: 1 %
MCH RBC QN AUTO: 21 PG (ref 25–35)
MCHC RBC AUTO-ENTMCNC: 29.6 G/DL (ref 31–37)
MCV RBC AUTO: 70.8 FL (ref 80–100)
MONOCYTES # BLD AUTO: 1 K/UL (ref 0–1)
MONOCYTES NFR BLD AUTO: 3 %
NEUTROPHILS # BLD AUTO: 34.8 K/UL (ref 1.3–7.7)
NEUTROPHILS NFR BLD AUTO: 95 %
PH UR: 5 [PH] (ref 5–8)
PLATELET # BLD AUTO: 922 K/UL (ref 150–450)
POTASSIUM SERPL-SCNC: 4.8 MMOL/L (ref 3.5–5.1)
PROT SERPL-MCNC: 5.6 G/DL (ref 6.3–8.2)
PT BLD: 13.2 SEC (ref 9–12)
SODIUM SERPL-SCNC: 130 MMOL/L (ref 137–145)
SP GR UR: 1.03 (ref 1–1.03)
UROBILINOGEN UR QL STRIP: <2 MG/DL (ref ?–2)
WBC # BLD AUTO: 36.6 K/UL (ref 3.8–10.6)

## 2023-01-16 PROCEDURE — 82140 ASSAY OF AMMONIA: CPT

## 2023-01-16 PROCEDURE — 85730 THROMBOPLASTIN TIME PARTIAL: CPT

## 2023-01-16 PROCEDURE — 96365 THER/PROPH/DIAG IV INF INIT: CPT

## 2023-01-16 PROCEDURE — 36415 COLL VENOUS BLD VENIPUNCTURE: CPT

## 2023-01-16 PROCEDURE — 71260 CT THORAX DX C+: CPT

## 2023-01-16 PROCEDURE — 86920 COMPATIBILITY TEST SPIN: CPT

## 2023-01-16 PROCEDURE — 71045 X-RAY EXAM CHEST 1 VIEW: CPT

## 2023-01-16 PROCEDURE — 74177 CT ABD & PELVIS W/CONTRAST: CPT

## 2023-01-16 PROCEDURE — 93005 ELECTROCARDIOGRAM TRACING: CPT

## 2023-01-16 PROCEDURE — 86900 BLOOD TYPING SEROLOGIC ABO: CPT

## 2023-01-16 PROCEDURE — 83880 ASSAY OF NATRIURETIC PEPTIDE: CPT

## 2023-01-16 PROCEDURE — 86850 RBC ANTIBODY SCREEN: CPT

## 2023-01-16 PROCEDURE — 87040 BLOOD CULTURE FOR BACTERIA: CPT

## 2023-01-16 PROCEDURE — 82272 OCCULT BLD FECES 1-3 TESTS: CPT

## 2023-01-16 PROCEDURE — 96375 TX/PRO/DX INJ NEW DRUG ADDON: CPT

## 2023-01-16 PROCEDURE — 96361 HYDRATE IV INFUSION ADD-ON: CPT

## 2023-01-16 PROCEDURE — 71046 X-RAY EXAM CHEST 2 VIEWS: CPT

## 2023-01-16 PROCEDURE — 83690 ASSAY OF LIPASE: CPT

## 2023-01-16 PROCEDURE — 99292 CRITICAL CARE ADDL 30 MIN: CPT

## 2023-01-16 PROCEDURE — 36430 TRANSFUSION BLD/BLD COMPNT: CPT

## 2023-01-16 PROCEDURE — 74018 RADEX ABDOMEN 1 VIEW: CPT

## 2023-01-16 PROCEDURE — 86901 BLOOD TYPING SEROLOGIC RH(D): CPT

## 2023-01-16 PROCEDURE — 83605 ASSAY OF LACTIC ACID: CPT

## 2023-01-16 PROCEDURE — 80053 COMPREHEN METABOLIC PANEL: CPT

## 2023-01-16 PROCEDURE — 87636 SARSCOV2 & INF A&B AMP PRB: CPT

## 2023-01-16 PROCEDURE — 81003 URINALYSIS AUTO W/O SCOPE: CPT

## 2023-01-16 PROCEDURE — 85610 PROTHROMBIN TIME: CPT

## 2023-01-16 PROCEDURE — 84484 ASSAY OF TROPONIN QUANT: CPT

## 2023-01-16 PROCEDURE — 99291 CRITICAL CARE FIRST HOUR: CPT

## 2023-01-16 PROCEDURE — 82150 ASSAY OF AMYLASE: CPT

## 2023-01-16 PROCEDURE — 85025 COMPLETE CBC W/AUTO DIFF WBC: CPT

## 2023-01-16 RX ADMIN — CEFAZOLIN SCH: 330 INJECTION, POWDER, FOR SOLUTION INTRAMUSCULAR; INTRAVENOUS at 23:39

## 2023-01-16 RX ADMIN — PANTOPRAZOLE SODIUM SCH MG: 40 INJECTION, POWDER, FOR SOLUTION INTRAVENOUS at 22:22

## 2023-01-16 NOTE — CT
EXAMINATION TYPE: CT ChestAbdPelvis w con

 

DATE OF EXAM: 1/16/2023

 

COMPARISON: PET CT scan 3/25/2012 CT abdomen and pelvis 11/12/2022

 

HISTORY: Chest pain, SOB and abdominal pain. History of esophageal cancer.

 

CT DLP: 772.9 mGycm

Automated exposure control for dose reduction was used.

 

CONTRAST: 

Performed with IV Contrast, patient injected with 100ml mL of Isovue 300.

 

Images obtained from the thoracic inlet to the floor of the pelvis with the IV contrast.

 

There is large left-sided pneumothorax. There is significant atelectasis of the left lung with large 
left pleural effusion. There is consolidation left lower lobe. The right lung is clear of consolidati
on. There is increased soft tissue density in the subcarinal region that could relate to esophageal m
ass. This measures almost 3 cm. There is dilated lower esophagus with some fluid extension to the lef
t side which could be perforation. There is gastrostomy tube. Gallbladder is intact. There is some he
terogeneity in the spleen. There are multiple rounded low density masses in the liver that measure up
 to 3.5 cm. No sign of pancreatic mass.

 

No adrenal mass. Kidneys show satisfactory contrast opacification. No hydronephrosis. Bladder distend
s smoothly. No inguinal hernia. There is bilateral scrotal hydroceles. No evidence of ascites. No sherman
dence of a bowel obstruction. No free air.

 

The sternum is intact. Thoracic spine and lumbar spine are intact. No compression fracture. Bony pelv
is is intact.

 

IMPRESSION:

Multiple liver masses consistent with metastatic disease which are slightly increased compared to 11/
12/2022 exam.

 

Extensive airspace consolidation in the left lower lobe with hydropneumothorax.

 

Increased subcarinal density could relate to esophageal mass or adenopathy and is new compared to PET
/CT scan of 3/25/2022. Dilated lower esophagus with fluid level and possible fluid extension and perf
oration into the pleural space on the left side which could account for the hydropneumothorax.

## 2023-01-16 NOTE — XR
EXAMINATION TYPE: XR chest 2V

 

DATE OF EXAM: 1/16/2023

 

COMPARISON: 12/19/2022

 

HISTORY: Difficulty breathing

 

TECHNIQUE: 2 views

 

FINDINGS: There is air-fluid level in the left hemithorax related to large hydropneumothorax. There i
s some interstitial infiltrate in the right lung. Trachea is midline. Right central venous catheter w
ith tip in the superior vena cava. There is consolidation and atelectasis in the left lower lobe. The
re are chest leads. Bony thorax is intact.

 

IMPRESSION: Left side large hydropneumothorax with air space consolidation left lower lobe.

Hydropneumothorax is a change compared to old exam.

Exam was discussed with emergency room attending staff at 5:00 PM.

## 2023-01-16 NOTE — XR
EXAMINATION TYPE: XR chest 1V portable

 

DATE OF EXAM: 1/16/2023

 

COMPARISON: 3

 

HISTORY: Short of breath

 

TECHNIQUE: Single view

 

FINDINGS: There is large left-sided hydropneumothorax. There is apparent complete atelectasis of the 
left lung. Trachea is deviated slightly to the right side right lung is clear of consolidation. There
 are chest leads.

 

IMPRESSION: Left-sided hydropneumothorax with some tension. Shift of the trachea is increased compare
d to exam 2 hours ago.

## 2023-01-16 NOTE — XR
EXAMINATION TYPE: XR KUB

 

DATE OF EXAM: 1/16/2023

 

COMPARISON: NONE

 

HISTORY: Abdominal pain

 

TECHNIQUE: 2 views

 

FINDINGS: Two-view supine were obtained that show no central intestinal obstruction or pneumoperitone
um. There is gas down to the rectum. There is left-sided hydropneumothorax. There is infiltrate left 
lower lobe. There is gastrostomy tube noted.

 

IMPRESSION: Nonacute bowel gas pattern.

## 2023-01-16 NOTE — ED
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 Hypochloremia, Lactic 

acidosis





Disposition: ADMITTED AS IP TO THIS HOSP


Condition: Poor


Is patient prescribed a controlled substance at d/c from ED?: No


Referrals: 


Mariana Brito MD [Primary Care Provider] - 1-2 days


Time of Disposition: 20:46


Decision Date: 01/16/23


Decision Time: 20:46

## 2023-01-17 ENCOUNTER — HOSPITAL ENCOUNTER (INPATIENT)
Dept: HOSPITAL 47 - 5NMEDONC | Age: 58
LOS: 1 days | DRG: 951 | End: 2023-01-18
Payer: COMMERCIAL

## 2023-01-17 VITALS
TEMPERATURE: 97.7 F | HEART RATE: 67 BPM | SYSTOLIC BLOOD PRESSURE: 90 MMHG | DIASTOLIC BLOOD PRESSURE: 55 MMHG | RESPIRATION RATE: 18 BRPM

## 2023-01-17 DIAGNOSIS — J94.8: ICD-10-CM

## 2023-01-17 DIAGNOSIS — F90.9: ICD-10-CM

## 2023-01-17 DIAGNOSIS — Z87.81: ICD-10-CM

## 2023-01-17 DIAGNOSIS — Z87.891: ICD-10-CM

## 2023-01-17 DIAGNOSIS — Z66: ICD-10-CM

## 2023-01-17 DIAGNOSIS — J98.51: ICD-10-CM

## 2023-01-17 DIAGNOSIS — D64.81: ICD-10-CM

## 2023-01-17 DIAGNOSIS — E87.70: ICD-10-CM

## 2023-01-17 DIAGNOSIS — D63.0: ICD-10-CM

## 2023-01-17 DIAGNOSIS — I47.20: ICD-10-CM

## 2023-01-17 DIAGNOSIS — Z79.82: ICD-10-CM

## 2023-01-17 DIAGNOSIS — Z51.5: Primary | ICD-10-CM

## 2023-01-17 DIAGNOSIS — E87.1: ICD-10-CM

## 2023-01-17 DIAGNOSIS — Z85.01: ICD-10-CM

## 2023-01-17 DIAGNOSIS — K22.3: ICD-10-CM

## 2023-01-17 DIAGNOSIS — T38.6X5A: ICD-10-CM

## 2023-01-17 DIAGNOSIS — Z92.21: ICD-10-CM

## 2023-01-17 DIAGNOSIS — Z92.3: ICD-10-CM

## 2023-01-17 DIAGNOSIS — Z87.828: ICD-10-CM

## 2023-01-17 DIAGNOSIS — K22.89: ICD-10-CM

## 2023-01-17 DIAGNOSIS — E86.1: ICD-10-CM

## 2023-01-17 DIAGNOSIS — R65.20: ICD-10-CM

## 2023-01-17 DIAGNOSIS — F32.A: ICD-10-CM

## 2023-01-17 DIAGNOSIS — Z79.899: ICD-10-CM
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
## 2023-01-17 DIAGNOSIS — C15.9: ICD-10-CM

## 2023-01-17 DIAGNOSIS — F41.9: ICD-10-CM

## 2023-01-17 DIAGNOSIS — A41.9: ICD-10-CM

## 2023-01-17 DIAGNOSIS — N17.9: ICD-10-CM

## 2023-01-17 RX ADMIN — HYDROMORPHONE HYDROCHLORIDE PRN MG: 1 INJECTION, SOLUTION INTRAMUSCULAR; INTRAVENOUS; SUBCUTANEOUS at 20:57

## 2023-01-17 RX ADMIN — HYDROMORPHONE HYDROCHLORIDE PRN MG: 1 INJECTION, SOLUTION INTRAMUSCULAR; INTRAVENOUS; SUBCUTANEOUS at 13:43

## 2023-01-17 RX ADMIN — PANTOPRAZOLE SODIUM SCH MG: 40 INJECTION, POWDER, FOR SOLUTION INTRAVENOUS at 07:51

## 2023-01-17 RX ADMIN — HYDROMORPHONE HYDROCHLORIDE PRN MG: 1 INJECTION, SOLUTION INTRAMUSCULAR; INTRAVENOUS; SUBCUTANEOUS at 04:31

## 2023-01-17 RX ADMIN — CEFAZOLIN SCH: 330 INJECTION, POWDER, FOR SOLUTION INTRAMUSCULAR; INTRAVENOUS at 13:00

## 2023-01-17 RX ADMIN — CEFAZOLIN SCH MLS/HR: 330 INJECTION, POWDER, FOR SOLUTION INTRAMUSCULAR; INTRAVENOUS at 02:51

## 2023-01-17 RX ADMIN — ONDANSETRON SCH MG: 2 INJECTION INTRAMUSCULAR; INTRAVENOUS at 10:48

## 2023-01-17 RX ADMIN — HYDROMORPHONE HYDROCHLORIDE PRN MG: 1 INJECTION, SOLUTION INTRAMUSCULAR; INTRAVENOUS; SUBCUTANEOUS at 23:09

## 2023-01-17 RX ADMIN — HYDROMORPHONE HYDROCHLORIDE PRN MG: 1 INJECTION, SOLUTION INTRAMUSCULAR; INTRAVENOUS; SUBCUTANEOUS at 07:52

## 2023-01-17 RX ADMIN — ONDANSETRON SCH MG: 2 INJECTION INTRAMUSCULAR; INTRAVENOUS at 02:51

## 2023-01-17 RX ADMIN — HYDROMORPHONE HYDROCHLORIDE PRN MG: 1 INJECTION, SOLUTION INTRAMUSCULAR; INTRAVENOUS; SUBCUTANEOUS at 10:48

## 2023-01-17 RX ADMIN — ONDANSETRON SCH MG: 2 INJECTION INTRAMUSCULAR; INTRAVENOUS at 06:39

## 2023-01-17 NOTE — P.DS
Providers


Date of admission: 


01/16/23 20:42





Attending physician: 


Gomez Adamson





Primary care physician: 


Mariana Brito





[facility] Course: 


Patient being switched to inpatient hospice please refer to HPI for further 

details





Patient Condition at Discharge: Poor





Plan - Discharge Summary


Discharge Rx Participant: No


New Discharge Prescriptions: 


No Action


   Prochlorperazine [Compazine] 10 mg PO Q8H PRN


     PRN Reason: Nausea And Vomiting


   Ondansetron Odt [Zofran ODT] 8 mg PO Q8HR PRN


     PRN Reason: Nausea And Vomiting


   HYDROcodone/APAP 7.5-325MG [Norco 7.5-325] 1 tab PO Q4H PRN


     PRN Reason: Pain


   Sennosides/Docusate Sodium [Senna-S 8.6-50 mg Tablet] 1 tab PO BID


   Pantoprazole Sodium [Protonix] 40 mg PO BID


   QUEtiapine [SEROquel] 25 mg PO HS


   Ferrous Sulfate [Feosol] 325 mg PO DAILY


   Morphine Sulfate [Ms Contin] 30 mg PO Q12HR


Discharge Medication List





HYDROcodone/APAP 7.5-325MG [Norco 7.5-325] 1 tab PO Q4H PRN 12/15/22 [History]


Ondansetron Odt [Zofran ODT] 8 mg PO Q8HR PRN 12/15/22 [History]


Pantoprazole Sodium [Protonix] 40 mg PO BID 12/15/22 [History]


Prochlorperazine [Compazine] 10 mg PO Q8H PRN 12/15/22 [History]


Sennosides/Docusate Sodium [Senna-S 8.6-50 mg Tablet] 1 tab PO BID 12/15/22 

[History]


Ferrous Sulfate [Feosol] 325 mg PO DAILY 01/17/23 [History]


Morphine Sulfate [Ms Contin] 30 mg PO Q12HR 01/17/23 [History]


QUEtiapine [SEROquel] 25 mg PO HS 01/17/23 [History]








Follow up Appointment(s)/Referral(s): 


Mariana Brito MD [Primary Care Provider] - 1-2 days
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
## 2023-01-18 NOTE — P.DS
Providers


Date of admission: 


23 15:01





Attending physician: 


Gomez Adamson





Primary care physician: 


[facility] Course: 


Patient with advanced esophageal cancer on.  Her radiation therapy admitted for 

esophageal rupture and hydropneumothorax severe sepsis subsequently switched to 

inpatient hospice patient .  Please refer to nursing documentation for 

time of death.





Preliminary cause of death: Esophageal rupture, mediastinitis hydropneumothorax








Plan - Discharge Summary


New Discharge Prescriptions: 


No Action


   Prochlorperazine [Compazine] 10 mg PO Q8H PRN


     PRN Reason: Nausea And Vomiting


   Ondansetron Odt [Zofran ODT] 8 mg PO Q8HR PRN


     PRN Reason: Nausea And Vomiting


   HYDROcodone/APAP 7.5-325MG [Norco 7.5-325] 1 tab PO Q4H PRN


     PRN Reason: Pain


   Sennosides/Docusate Sodium [Senna-S 8.6-50 mg Tablet] 1 tab PO BID


   Pantoprazole Sodium [Protonix] 40 mg PO BID


   QUEtiapine [SEROquel] 25 mg PO HS


   Ferrous Sulfate [Feosol] 325 mg PO DAILY


   Morphine Sulfate [Ms Contin] 30 mg PO Q12HR


Discharge Medication List





HYDROcodone/APAP 7.5-325MG [Norco 7.5-325] 1 tab PO Q4H PRN 12/15/22 [History]


Ondansetron Odt [Zofran ODT] 8 mg PO Q8HR PRN 12/15/22 [History]


Pantoprazole Sodium [Protonix] 40 mg PO BID 12/15/22 [History]


Prochlorperazine [Compazine] 10 mg PO Q8H PRN 12/15/22 [History]


Sennosides/Docusate Sodium [Senna-S 8.6-50 mg Tablet] 1 tab PO BID 12/15/22 

[History]


Ferrous Sulfate [Feosol] 325 mg PO DAILY 23 [History]


Morphine Sulfate [Ms Contin] 30 mg PO Q12HR 23 [History]


QUEtiapine [SEROquel] 25 mg PO HS 23 [History]








Discharge Disposition: 





- Preliminary Cause of Death


Preliminary Cause of Death: Esophageal rupture
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
